# Patient Record
Sex: FEMALE | Race: WHITE | Employment: FULL TIME | URBAN - METROPOLITAN AREA
[De-identification: names, ages, dates, MRNs, and addresses within clinical notes are randomized per-mention and may not be internally consistent; named-entity substitution may affect disease eponyms.]

---

## 2017-03-01 NOTE — PROGRESS NOTES
HPI:    Patient ID: Norina Lombard is a 31year old female. HPI Comments: Fever, chills,..   Body aches. Slight cough,         Review of Systems   Constitutional: Positive for fever, chills and fatigue.  Negative for appetite change and unexpected jude

## 2019-12-24 NOTE — PATIENT INSTRUCTIONS
Medrol dose pack  Take the pills throughout the day   Don't take them before bedtime.      Alternate between ice and heat

## 2019-12-24 NOTE — PROGRESS NOTES
HPI:    Patient ID: Barbie Bennett is a 42year old female. Patient presents for low back pain for the past 1 week. Pain is located in the low back on right side and travels down the leg. No fever/chills. No injury or trauma noted.  Has trouble gettin deficit or motor deficit. Skin: Skin is warm and dry. ASSESSMENT/PLAN:   1.  Acute right-sided low back pain with right-sided sciatica  -After discussion with patient, advised the following:  -To take medrol dose pack   -Educated pt on how to

## 2019-12-26 NOTE — TELEPHONE ENCOUNTER
Spoke with patient via [address] [facility]  ID# [id number] during call  disconnected called back continued call with  ID# [id number]; patient states she is still having right sciatica pain 6-7/10 and the medication is not working.  Patient is r

## 2019-12-26 NOTE — TELEPHONE ENCOUNTER
Noted. Created and signed letter for patient to be off work until Monday. Please call pt to tell her that her letter is ready for . Based on results of x-ray will decide next plan of action.

## 2019-12-26 NOTE — TELEPHONE ENCOUNTER
Pt states that she is still having pain with the sciatica. Pt states that she is taking the medication that was prescribed to her. Per pt she will go today to get her x-ray done. Transferred call to triage.

## 2019-12-31 NOTE — PROGRESS NOTES
HPI:    Patient ID: Beckie Costa is a 42year old female. Patient presents for follow-up on low back pain. The area where the pain is starting to feel hot. No fever/chills noted. Was given medrol dose pack last week and has completed the course.  Feel deficit. Skin: Skin is warm and dry. ASSESSMENT/PLAN:   1.  Acute bilateral low back pain with right-sided sciatica  -After discussion with patient, advised the following:  -Told to take naprosyn for pain relief  -Educated pt on how to take t

## 2020-01-02 NOTE — TELEPHONE ENCOUNTER
Patient requesting note for work stating she can do her work as usual she just cant lift anything heavy due to her sciatic pain. Also stating that she is currently in physical therapy for it,.

## 2020-01-13 NOTE — TELEPHONE ENCOUNTER
Patient requesting a new note stating when the next follow up visit will be with her after physical therapy and until how long is she unable to lift weight.  She stated she has been having difficulty starting the process of booking physical therapy sessions

## 2020-01-14 NOTE — TELEPHONE ENCOUNTER
Patient needs to book physical therapy first then I can tell her how long after. The initial visit gives me an idea of how long the physical therapist would like to work on the patient. I have written a letter for 2 months to be on restricted duty.  Please

## 2020-02-18 NOTE — PROGRESS NOTES
LUMBAR SPINE EVALUATION:   Referring Physician: Dr. Nalini Bender  Diagnosis:  M54.41 (ICD-10-CM) - 724.2, 724.3, 338.19 (ICD-9-CM) - Acute bilateral low back pain with right-sided sciatica Date of Service: 2/17/2020   Date of Onset: December 24, 2019  Clarence martinez approximately 12/17/19 and then on 12/24/29 patient in so much pain was unable to get out of bed due to pain. Saw her physician assistant on same day and was issued medrol dose pack and xray of lumbar spine.   Patient returned to work on 1/6/20 with restric Hip Extension NT NT      Flexibility: WNL, min, mod, severe     R L   Hamstrings moderately restricted moderately restricted   Piriformis moderately restricted moderately restricted   Hip Flexor moderately restricted moderately restricted   TFL moderatel with <2/10 pain and max pain at 2/10  · Pt will be independent and compliant with comprehensive HEP to maintain progress achieved in PT     Frequency / Duration: Patient will be seen for 1-2x/week or a total of 6 initially approved visits over a 90 day per

## 2020-02-19 NOTE — PROGRESS NOTES
Dx: M54.41 (ICD-10-CM) - 724.2, 724.3, 338.19 (ICD-9-CM) - Acute bilateral low back pain with right-sided sciatica         Authorized # of Visits:  6 HMO         Next MD visit: none scheduled  Fall Risk: standard         Precautions: none significant, nathaly to be able to perform work type tasks with <2/10 pain and max pain at 2/10  · Pt will be independent and compliant with comprehensive HEP to maintain progress achieved in PT       Plan: Continue for stretching, strengthening, ROM, postural training, HEP tr

## 2020-02-24 NOTE — PROGRESS NOTES
Dx: M54.41 (ICD-10-CM) - 724.2, 724.3, 338.19 (ICD-9-CM) - Acute bilateral low back pain with right-sided sciatica         Authorized # of Visits:  6 HMO         Next MD visit: none scheduled  Fall Risk: standard         Precautions: none significant, nathaly of proper posture and body mechanics to decrease pain and improve spinal safety   · Pt will improve painfree lumbar spine AROM flexion less than 3\" to floor to allow increase ease with bending forward to don shoes   · Pt will report improved symptom centr

## 2020-03-03 NOTE — PROGRESS NOTES
DC NOTE: Contacted pt to discuss department's initiative to protect all non-essential and high risk patients from COVID-19 exposure. Discussed recommendations relative to pt's home program and to continue with HEP.    Explained that the clinic is cancelli secs  -prone knee flexion: 10 x 10 secs  -Body mechanics training with lifting 6\" off floor to 42\" counter 10 x 2  -prone leg lifts with core activation 5 x 5 secs   -sidelying lateral trunk flexion       Manual:  Lumbar PA mobs-grade III  Lumbar harmoni

## 2020-03-05 NOTE — PROGRESS NOTES
Dx: M54.41 (ICD-10-CM) - 724.2, 724.3, 338.19 (ICD-9-CM) - Acute bilateral low back pain with right-sided sciatica         Authorized # of Visits:  6 HMO         Next MD visit: none scheduled  Fall Risk: standard         Precautions: none significant, nathaly harmonics   Manual:  STM to both lumbar p/s and quad  lumborum  Lumbar harmonics Man tx:  -STM to both lumbar p/s and quad  lumborum  Lumbar harmonics Manual tx: held today   HEP:   HEP: bridge ex and PPU HEP:  Standing back bends  HEP : same HEP.         A

## 2020-03-23 NOTE — TELEPHONE ENCOUNTER
Mongolian Speaking - Patient is requesting a letter from NP Tawanna Skaggs because she states she will be out of work for this week. Patient spoke with NP regarding symptoms over the phone (See Acute Encounter 03/23/2020).  Patient is requesting letter to be

## 2020-03-23 NOTE — TELEPHONE ENCOUNTER
Your patient called with body aches, cough and sore throat. Patient denies any traveling or being in contact with someone who tested positive for the coronavirus.     Please contact patient to determine if they should be treated over the phone by you or req

## 2020-03-23 NOTE — TELEPHONE ENCOUNTER
Virtual/Telephone Check-In    Damir Velarde verbally consents to a Virtual/Telephone Check-In service on 03/23/20. Patient understands and accepts financial responsibility for any deductible, co-insurance and/or co-pays associated with this service.

## 2020-03-25 NOTE — TELEPHONE ENCOUNTER
Contacted pt to discuss department's initiative to protect all non-essential and high risk patients from COVID-19 exposure. Discussed recommendations relative to pt's home program and to continue with HEP.    Explained that the clinic is cancelling visits f

## 2020-03-28 NOTE — TELEPHONE ENCOUNTER
Called with the assistance of language line solutions (#). [address]  Patient does not meet the criteria for COVID 19 testing.      DEE DEE Sosa   Per the system protocol please contact patient to determine if they should be treated over the phone by you or

## 2020-03-28 NOTE — TELEPHONE ENCOUNTER
Patient has not yet received the letter and it was again mailed to her home at her request, she does not currently have a CellPlyhart sign on and will have to go online to retrieve the password

## 2020-03-28 NOTE — TELEPHONE ENCOUNTER
Called and talked to patient. She has been sick with cold symptoms for the past 5 days or so. She has a cough. Body aches have resolved. Still has sour taste in mouth. No fever/chills. No shortness of breath or wheezing noted. No appetite either.  No sense

## 2020-03-31 NOTE — TELEPHONE ENCOUNTER
Patient is requesting for the note to work for it to be faxed to: [phone] ATTN: Osman Youngblood. Please call patient before the letter is sent.

## 2020-03-31 NOTE — TELEPHONE ENCOUNTER
Bahraini Speaking - Patient is calling to follow up and states she never received letter. Please advise.

## 2020-04-02 NOTE — TELEPHONE ENCOUNTER
Patient called back on status. Patient notified of provider's recommendation. Patient verbalized understanding. Patient requested a specific letter to employer. 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   She states the general letter wasn't suff

## 2020-04-02 NOTE — TELEPHONE ENCOUNTER
Message noted. Please call pt and tell her that she can take advil 2 times per day for her headaches as needed. She needs to hydrate well and rest. Continue to eat small amounts of food throughout the day.  If she has high fevers that are not breaking or si

## 2020-04-02 NOTE — TELEPHONE ENCOUNTER
Language line #[phone] assisted with the phone call. This call was on update on 03/28 symptoms.   Pt states that while her cough, bad taste in her mouth and slight fever are gone, she now for the past three days has had a bad headache (forehead, and back of

## 2020-04-07 NOTE — TELEPHONE ENCOUNTER
Working from home today and do not have access to language line. Dr. Melany Baker, patient is Latvian speaking. Do you mind calling this patient for me and seeing what else we can do for?  I did a telephone encounter with the patient infor viral infection last we

## 2020-04-07 NOTE — TELEPHONE ENCOUNTER
Virtual/Telephone Check-In    Beckie Costa verbally consents to a Virtual/Telephone Check-In service on 04/07/20. Patient understands and accepts financial responsibility for any deductible, co-insurance and/or co-pays associated with this service.

## 2020-04-17 NOTE — TELEPHONE ENCOUNTER
Patient called with help from language line Rep #[id number] . Patient was sick a couple weeks ago and  wrote a note to employer for the patient to be out of work until 4/13.      However Monday the patient started getting headaches and dizziness and asked

## 2020-04-20 NOTE — TELEPHONE ENCOUNTER
Patient calling to follow up. States she wanted an additional 2 weeks off. Requesting to go back to work on 5/4. Will like note faxed to her employer to number below.  Patient requesting call back once completed

## 2020-04-21 NOTE — TELEPHONE ENCOUNTER
Patient's last visit note successfully faxed to pt's work . Fax # [phone]. Confirmation  # placed in scanning.

## 2020-04-21 NOTE — PROGRESS NOTES
Virtual Telephone Check-In    Ron Castellanos verbally consents to a Virtual/Telephone Check-In visit on 04/21/20. Patient understands and accepts financial responsibility for any deductible, co-insurance and/or co-pays associated with this service.

## 2020-04-21 NOTE — TELEPHONE ENCOUNTER
Last visit note successfully faxed to pt work. Fax # [phone]. Confirmation number placed in scanning.

## 2021-04-02 NOTE — PROGRESS NOTES
HPI/Subjective:   Patient ID: Ron Castellanos is a 47year old female. Here for annual physical. No complaints. Wanted to do blood test... History/Other:   Review of Systems Constitutional: Negative.   Negative for activity change, appetite camargo

## 2021-11-12 NOTE — PROGRESS NOTES
Subjective:   Patient ID: Cheryle Mckusick is a 46year old female. 10/28 had some menstrual spotting for some days   Then 11/1-11/5 had almost normal period since then having just slight bleeding which is almost gone but still has some   Otherwise well.

## 2022-01-17 NOTE — TELEPHONE ENCOUNTER
Refill passed per [place] [facility] [facility], [facility] protocol.   Requested Prescriptions   Pending Prescriptions Disp Refills    OMEPRAZOLE 40 MG Oral Capsule Delayed Release [Pharmacy Med Name: OMEPRAZOLE 40MG CAPSULES] 90 capsule 0     Sig: TAKE 1 CAPSULE(40 MG) BY MOUTH DAILY        Gastrointestional Medication Protocol Passed - 1/16/2022  3:19 PM        Passed - Appointment in past 12 or next 3 months             Recent Outpatient Visits              2 months ago Menstrual disorder    Corbin Tripp MD    Office Visit    9 months ago Patient overweight    Corbin Tripp MD    Office Visit    1 year ago Upper respiratory infection, acute    [facility], [address] [facility] [place], [place] Abhijeet    [facility]    1 year ago     [address], [place]    Office Visit    1 year ago     [facility] Evelyn Ann, [place]    Office Visit

## 2022-06-01 NOTE — TELEPHONE ENCOUNTER
Patient returning call name and date of birth verified, informed patient of dr notes per lab results and to fullow up in 3 months per pt verbalized understanding, offered patient to make an appt per pt stated she would call to make an appt, no further questions

## 2022-12-14 NOTE — TELEPHONE ENCOUNTER
Refill passed per [facility] protocol   Requested Prescriptions   Pending Prescriptions Disp Refills    OMEPRAZOLE 40 MG Oral Capsule Delayed Release [Pharmacy Med Name: OMEPRAZOLE 40MG CAPSULES] 90 capsule 1     Sig: TAKE 1 CAPSULE(40 MG) BY MOUTH DAILY       Gastrointestional Medication Protocol Passed - 12/13/2022  6:53 PM        Passed - In person appointment or virtual visit in the past 12 mos or appointment in next 3 mos     Recent Outpatient Visits              6 months ago Annual physical exam    Jeyson Madrigal MD    Office Visit    1 year ago Menstrual disorder    Jeyson Madrigal MD    Office Visit    1 year ago Patient overweight    Jeyson Madrigal MD    Office Visit    2 years ago Upper respiratory infection, acute    [address] [place], [address] [facility] [place], Nathan Chamberlain MD    [facility]    2 years ago     [facility] Tristian [place], [address] [place]    Office Visit

## 2024-01-17 NOTE — PROGRESS NOTES
Subjective:   Patient ID: Mica Laws is a 43 year old female.    Pt presents with hx of irregular and heavy periods over the last several months and was seeing a gynecologist. No fevers or sig pains.   Pt did have a pap smear which was normal with a private gynecologist. Stared on OCP but does not think this is helping.    Pt needs referral for gynecologist.   Information obtained with help of .        History/Other:   Review of Systems   Constitutional:  Negative for fever.   Genitourinary:  Positive for vaginal bleeding.     Current Outpatient Medications   Medication Sig Dispense Refill    Norgestim-Eth Estrad Triphasic 0.18/0.215/0.25 MG-35 MCG Oral Tab Take 1 tablet by mouth daily.      famotidine 20 MG Oral Tab Take 1 tablet (20 mg total) by mouth 2 (two) times daily.      Omeprazole 40 MG Oral Capsule Delayed Release Take 1 capsule (40 mg total) by mouth daily. 90 capsule 1     Allergies:No Known Allergies    Objective:   Physical Exam  Constitutional:       Appearance: Normal appearance.   Neurological:      Mental Status: She is alert.   Psychiatric:         Mood and Affect: Mood normal.         Behavior: Behavior normal.         Assessment & Plan:   1. Irregular periods    2. Menorrhagia with irregular cycle: not better on OCP  - After discussion with help of , will send to gynecology for further evaluation and treatment; To call if any significant symptoms. ER if any sig symptoms or bleeding. Patient verbalized understanding of recommendations and agrees to plan.             No orders of the defined types were placed in this encounter.      Meds This Visit:  Requested Prescriptions      No prescriptions requested or ordered in this encounter       Imaging & Referrals:  [facility] - INTERNAL

## 2024-01-18 ENCOUNTER — HOSPITAL ENCOUNTER (EMERGENCY)
Facility: HOSPITAL | Age: 44
Discharge: HOME | End: 2024-01-18
Attending: EMERGENCY MEDICINE
Payer: COMMERCIAL

## 2024-01-18 VITALS
SYSTOLIC BLOOD PRESSURE: 119 MMHG | WEIGHT: 181 LBS | RESPIRATION RATE: 20 BRPM | HEART RATE: 86 BPM | HEIGHT: 60 IN | DIASTOLIC BLOOD PRESSURE: 71 MMHG | BODY MASS INDEX: 35.53 KG/M2 | TEMPERATURE: 98 F | OXYGEN SATURATION: 96 %

## 2024-01-18 DIAGNOSIS — N93.9 VAGINAL BLEEDING: Primary | ICD-10-CM

## 2024-01-18 DIAGNOSIS — D64.9 ANEMIA, UNSPECIFIED TYPE: ICD-10-CM

## 2024-01-18 LAB
ALBUMIN SERPL-MCNC: 3.6 G/DL (ref 3.2–4.8)
ALBUMIN/GLOB SERPL: 1.4 {RATIO} (ref 1–2)
ALP LIVER SERPL-CCNC: 60 U/L
ALT SERPL-CCNC: 38 U/L
ANION GAP SERPL CALC-SCNC: 4 MMOL/L (ref 0–18)
AST SERPL-CCNC: 24 U/L (ref ?–34)
BASOPHILS # BLD AUTO: 0.04 X10(3) UL (ref 0–0.2)
BASOPHILS NFR BLD AUTO: 0.5 %
BILIRUB SERPL-MCNC: 0.4 MG/DL (ref 0.3–1.2)
BUN BLD-MCNC: 13 MG/DL (ref 9–23)
BUN/CREAT SERPL: 17.3 (ref 10–20)
CALCIUM BLD-MCNC: 8.6 MG/DL (ref 8.7–10.4)
CHLORIDE SERPL-SCNC: 107 MMOL/L (ref 98–112)
CO2 SERPL-SCNC: 29 MMOL/L (ref 21–32)
CREAT BLD-MCNC: 0.75 MG/DL
DEPRECATED RDW RBC AUTO: 43.9 FL (ref 35.1–46.3)
EGFRCR SERPLBLD CKD-EPI 2021: 103 ML/MIN/1.73M2 (ref 60–?)
EOSINOPHIL # BLD AUTO: 0.08 X10(3) UL (ref 0–0.7)
EOSINOPHIL NFR BLD AUTO: 0.9 %
ERYTHROCYTE [DISTWIDTH] IN BLOOD BY AUTOMATED COUNT: 14.1 % (ref 11–15)
GLOBULIN PLAS-MCNC: 2.6 G/DL (ref 2.8–4.4)
GLUCOSE BLD-MCNC: 136 MG/DL (ref 70–99)
HCT VFR BLD AUTO: 26.9 %
HGB BLD-MCNC: 8.5 G/DL
IMM GRANULOCYTES # BLD AUTO: 0.03 X10(3) UL (ref 0–1)
IMM GRANULOCYTES NFR BLD: 0.3 %
LYMPHOCYTES # BLD AUTO: 1.77 X10(3) UL (ref 1–4)
LYMPHOCYTES NFR BLD AUTO: 20.4 %
MCH RBC QN AUTO: 27.2 PG (ref 26–34)
MCHC RBC AUTO-ENTMCNC: 31.6 G/DL (ref 31–37)
MCV RBC AUTO: 85.9 FL
MONOCYTES # BLD AUTO: 0.38 X10(3) UL (ref 0.1–1)
MONOCYTES NFR BLD AUTO: 4.4 %
NEUTROPHILS # BLD AUTO: 6.37 X10 (3) UL (ref 1.5–7.7)
NEUTROPHILS # BLD AUTO: 6.37 X10(3) UL (ref 1.5–7.7)
NEUTROPHILS NFR BLD AUTO: 73.5 %
OSMOLALITY SERPL CALC.SUM OF ELEC: 292 MOSM/KG (ref 275–295)
PLATELET # BLD AUTO: 375 10(3)UL (ref 150–450)
POTASSIUM SERPL-SCNC: 3.8 MMOL/L (ref 3.5–5.1)
PROT SERPL-MCNC: 6.2 G/DL (ref 5.7–8.2)
RBC # BLD AUTO: 3.13 X10(6)UL
SODIUM SERPL-SCNC: 140 MMOL/L (ref 136–145)
WBC # BLD AUTO: 8.7 X10(3) UL (ref 4–11)

## 2024-01-18 PROCEDURE — 99284 EMERGENCY DEPT VISIT MOD MDM: CPT

## 2024-01-18 PROCEDURE — 36415 COLL VENOUS BLD VENIPUNCTURE: CPT

## 2024-01-18 PROCEDURE — 80053 COMPREHEN METABOLIC PANEL: CPT

## 2024-01-18 PROCEDURE — 85025 COMPLETE CBC W/AUTO DIFF WBC: CPT | Performed by: EMERGENCY MEDICINE

## 2024-01-18 PROCEDURE — 80053 COMPREHEN METABOLIC PANEL: CPT | Performed by: EMERGENCY MEDICINE

## 2024-01-18 PROCEDURE — 99283 EMERGENCY DEPT VISIT LOW MDM: CPT

## 2024-01-18 PROCEDURE — 85025 COMPLETE CBC W/AUTO DIFF WBC: CPT

## 2024-01-18 NOTE — ED INITIAL ASSESSMENT (HPI)
Patient arrived via EMS from home with c/o vaginal bleeding since September that worsened yesterday. Reports having to change her pad every hour for the past 6 hours. Feels weak, shortness of breath on exertion.

## 2024-01-18 NOTE — ED PROVIDER NOTES
Patient Seen in: [facility] Emergency Department      History     Chief Complaint   Patient presents with    Weakness    Bleeding     Stated Complaint:     Subjective:   HPI    43-year-old female presents for evaluation of vaginal bleeding.  Patient reports she has had vaginal bleeding every day since September.  Since yesterday she has had heavier bleeding and now with clots.  She has an appointment with gynecology at 4 PM today, however started to feel lightheaded which prompted this visit to the emergency department.  She is not on anticoagulation.    Objective:   History reviewed. No pertinent past medical history.           History reviewed. No pertinent surgical history.             Social History     Socioeconomic History    Marital status: Single    Number of children: 2   Tobacco Use    Smoking status: Never    Smokeless tobacco: Never   Vaping Use    Vaping Use: Never used   Substance and Sexual Activity    Alcohol use: Never     Alcohol/week: 0.0 standard drinks of alcohol    Drug use: Never   Social History Narrative    2 children,                       Review of Systems    Positive for stated complaint:   Other systems are as noted in HPI.  Constitutional and vital signs reviewed.      All other systems reviewed and negative except as noted above.    Physical Exam     ED Triage Vitals [01/18/24 1220]   /77   Pulse 86   Resp 20   Temp 97.8 °F (36.6 °C)   Temp src Oral   SpO2 96 %   O2 Device None (Room air)       Current:/71   Pulse 86   Temp 97.8 °F (36.6 °C) (Oral)   Resp 20   Ht 152.4 cm (5')   Wt 82.1 kg   LMP 01/08/2024   SpO2 96%   BMI 35.35 kg/m²         Physical Exam  Vitals and nursing note reviewed.   Constitutional:       General: She is not in acute distress.     Appearance: Normal appearance. She is not ill-appearing or toxic-appearing.   HENT:      Head: Normocephalic and atraumatic.   Eyes:      Conjunctiva/sclera: Conjunctivae normal.   Cardiovascular:       Rate and Rhythm: Normal rate and regular rhythm.   Pulmonary:      Effort: Pulmonary effort is normal. No respiratory distress.      Breath sounds: Normal breath sounds.   Abdominal:      Palpations: Abdomen is soft.      Tenderness: There is no abdominal tenderness.   Genitourinary:     Comments: 2 cm clot cleared from vaginal vault, no active bleeding  Musculoskeletal:         General: Normal range of motion.      Cervical back: Normal range of motion. No rigidity.   Neurological:      General: No focal deficit present.      Mental Status: She is alert.   Psychiatric:         Mood and Affect: Mood normal.               ED Course     Labs Reviewed   COMP METABOLIC PANEL (14) - Abnormal; Notable for the following components:       Result Value    Glucose 136 (*)     Calcium, Total 8.6 (*)     Globulin  2.6 (*)     All other components within normal limits   CBC W/ DIFFERENTIAL - Abnormal; Notable for the following components:    RBC 3.13 (*)     HGB 8.5 (*)     HCT 26.9 (*)     All other components within normal limits   CBC WITH DIFFERENTIAL WITH PLATELET    Narrative:     The following orders were created for panel order CBC With Differential With Platelet.  Procedure                               Abnormality         Status                     ---------                               -----------         ------                     CBC W/ DIFFERENTIAL[[id number]]          Abnormal            Final result                 Please view results for these tests on the individual orders.   RAINBOW DRAW BLUE                      MDM      Medical Decision Making  Differential diagnosis includes but is not limited to dysfunctional uterine bleeding, malignancy, fibroids    Well-appearing patient with normal vital signs, noted to have a drop in hemoglobin, today Hemoglobin is 8.5, was 11.1 on 12/29/2023, prior to this was 13.9 on 5/28/2022.  Patient does not meet criteria for transfusion, discussed this with patient and niece at  the bedside.  Patient will however need further evaluation for definitive management, fortunately she has an appointment today at 4 PM, I did discuss this with the PA who is scheduled to see the patient in 1.5 hours.  Return precautions discussed.    Problems Addressed:  Vaginal bleeding: acute illness or injury    Amount and/or Complexity of Data Reviewed  External Data Reviewed: labs.  Labs: ordered.  Discussion of management or test interpretation with external provider(s): Discussed with gynecology        Disposition and Plan     Clinical Impression:  1. Vaginal bleeding    2. Anemia, unspecified type         Disposition:  Discharge  1/18/2024  2:10 pm    Follow-up:  Devin Tang PA-C [address]  [address] [address]  [phone]    Follow up            Medications Prescribed:  Discharge Medication List as of 1/18/2024  2:32 PM

## 2024-01-18 NOTE — PROGRESS NOTES
[facility]  Obstetrics and Gynecology  Gyne Problem Visit      Mica Laws is a 43 year old female  presenting today with heavy prolonged menses. States she has had irregular menses since 2023. November she had heavier bleeding with clots. In December she stopped for a few weeks then started to spot. Then this month she had heavier bleeding. She came here from the ER after going due to lightheadedness and dizziness. Her Hgb on 2023 11.2 and today her hcg dropped to 8.5. She was started on COCs this week by another provider. She denies a history of uterine fibroids. Prior to this patient had irregular menses but they were not heavy. She has not been on hormonal contraceptives prior to this week, She is still feeling very weak and dizzy.     Patient's last menstrual period was 2024 (exact date).     Pap: 2022  Contraception:OCP    OBSTETRICS HISTORY:  OB History    Para Term  AB Living   2 2 0 0 0 2   SAB IAB Ectopic Multiple Live Births   0 0 0 0 2       GYNE HISTORY:  Hx Prior Abnormal Pap: Yes   Menarche: 11 (2024  4:02 PM)  Period Cycle (Days): irregular (2024  4:02 PM)  Period Duration (Days): varys (2024  4:02 PM)  Period Flow: heavy (2024  4:02 PM)  Use of Birth Control (if yes, specify type): None (2024  4:02 PM)  Hx Prior Abnormal Pap: Yes (2024  4:02 PM)        2022    10:37 AM   RECENT PAP RESULTS   Thinprep Pap Negative for intraepithelial lesion or malignancy.          History   Sexual Activity    Sexual activity: Not on file       MEDICAL HISTORY:  No past medical history on file.  History reviewed. No pertinent surgical history.    SOCIAL HISTORY:  Social History     Socioeconomic History    Marital status: Single     Spouse name: Not on file    Number of children: 2    Years of education: Not on file    Highest education level: Not on file   Occupational History    Not on file   Tobacco Use    Smoking status: Never    Smokeless tobacco:  Never   Vaping Use    Vaping Use: Never used   Substance and Sexual Activity    Alcohol use: Never     Alcohol/week: 0.0 standard drinks of alcohol    Drug use: Never    Sexual activity: Not on file   Other Topics Concern    Not on file   Social History Narrative    2 children,              Social Determinants of Health     Financial Resource Strain: Not on file   Food Insecurity: Not on file   Transportation Needs: Not on file   Physical Activity: Not on file   Stress: Not on file   Social Connections: Not on file   Housing Stability: Not on file       MEDICATIONS:    Current Outpatient Medications:     Norgestim-Eth Estrad Triphasic 0.18/0.215/0.25 MG-35 MCG Oral Tab, Take 1 tablet by mouth daily., Disp: , Rfl:     famotidine 20 MG Oral Tab, Take 1 tablet (20 mg total) by mouth 2 (two) times daily., Disp: , Rfl:     Omeprazole 40 MG Oral Capsule Delayed Release, Take 1 capsule (40 mg total) by mouth daily., Disp: 90 capsule, Rfl: 1    ALLERGIES:  No Known Allergies      REVIEW OF SYSTEMS:  Review of Systems   Constitutional:  Negative for chills, fever and unexpected weight change.   Respiratory: Negative.     Cardiovascular: Negative.    Gastrointestinal:  Negative for abdominal pain, constipation, diarrhea and nausea.   Genitourinary:  Positive for menstrual problem (heavy prolonged menses). Negative for dyspareunia, dysuria, genital sores, hematuria, vaginal bleeding, vaginal discharge and vaginal pain.   Musculoskeletal: Negative.    Skin: Negative.    Neurological: Negative.    Hematological: Negative.    Psychiatric/Behavioral: Negative.         PHYSICAL EXAM:  /78   Ht 5' (1.524 m)   Wt 178 lb 3.2 oz (80.8 kg)   LMP 01/08/2024 (Exact Date)   BMI 34.80 kg/m²     GENERAL: well developed, well nourished, in no apparent distress  ABDOMEN: Soft, non distended; non tender, no masses  GYNE/: External Genitalia: Normal appearing, no lesions. Urethral meatus appear wnl, no abnormal discharge or lesions  noted.          Bladder: well supported, urethra wnl, no lesions or fissures                     Vagina: normal pink mucosa, no lesions, moderate menstrual                      Uterus: , mobile, non tender, normal size                     Cervix: Normal                      Adnexa: non tender, no masses, normal size    Endometrial Biopsy     Pre-Procedure Care:   Consent was obtained.  Procedure/risks were explained.  Questions were answered.  Correct patient was identified.  Correct side and site were confirmed.    Pregnancy Results: negative from urine test   Birth control method(s) used:      Indication: heavy prolonged menses     Description of Procedure:   A bivalve speculum was placed in the vagina and the cervix was prepped with betadine solution.   Single tooth tenaculum placed at the 12 o'clock position.   The uterine cavity was sounded at 8 cm.   The endometrial cavity was curetted for pipelle tissue sampling with 3 passes.  Specimen was sent to pathology.   The single tooth tenaculum was removed.   Silver nitrate was applied at the site of tenaculum application   Good hemostasis was noted.  There were no complications.    There was no blood loss.         ASSESSMENT:       ICD-10-CM    1. Menorrhagia with irregular cycle  N92.1       2. Excessive or frequent menstruation  N92.0 BIOPSY OF UTERUS LINING (00549)          Plan:  - Pt counseled on possible etiologies of heavy periods and/or irregular bleeding including uterine fibroids, DUB/anovulatory bleeding and other benign and less common malignant etiologies.  Discussed possible work-up options including exam, pelvic US and endometrial biopsy.  Discussed treatment options including medical and surgical.  Medical options include OCPs, Nuvaring, patch for cycle control along with depo provera for menstrual suppression.  Discussed Mirena IUD and menstrual benefits.  Discussed surgical options that include endometrial ablation and hysterectomy.  Pt counseled  on risks/benefits of each of the appropriate options.  - Pelvic ultrasound ordered  - Endometrial biopsy done  - Patient opted to switch from OCPs to Depo injection  - Advised to start iron supplements  - Strict return precautions given to patient for the ER, If she is changing multiple pads within an hour or passing clots larger than her fist she should return to ER.  - RTC in 2 weeks for follow-up      Requested Prescriptions     Pending Prescriptions Disp Refills    Ferrous Sulfate 325 (65 Fe) MG Oral Tab 30 tablet 3     Sig: Take 1 tablet (325 mg total) by mouth daily with breakfast.    medroxyPROGESTERone Acetate SANKET 150 mg         YVONNE CALVO PA-C  4:10 PM  1/18/2024        Spent total time 40 minutes on obtaining history / chart review, evaluating patient / performing medically appropriate exam, discussing treatment options, counseling / educating, and completing documentation, coordinating care.

## 2024-01-18 NOTE — DISCHARGE INSTRUCTIONS
See gynecology for further evaluation this afternoon.    Return to the ER if you develop worsening symptoms or any emergent concerns.

## 2024-01-19 ENCOUNTER — HOSPITAL ENCOUNTER (EMERGENCY)
Facility: HOSPITAL | Age: 44
Discharge: HOME | End: 2024-01-19
Attending: EMERGENCY MEDICINE
Payer: COMMERCIAL

## 2024-01-19 VITALS
SYSTOLIC BLOOD PRESSURE: 131 MMHG | HEIGHT: 60 IN | RESPIRATION RATE: 15 BRPM | DIASTOLIC BLOOD PRESSURE: 66 MMHG | OXYGEN SATURATION: 99 % | HEART RATE: 82 BPM | TEMPERATURE: 98 F | WEIGHT: 178 LBS | BODY MASS INDEX: 34.95 KG/M2

## 2024-01-19 DIAGNOSIS — D50.0 IRON DEFICIENCY ANEMIA DUE TO CHRONIC BLOOD LOSS: Primary | ICD-10-CM

## 2024-01-19 DIAGNOSIS — N92.1 MENOMETRORRHAGIA: ICD-10-CM

## 2024-01-19 LAB
ANION GAP SERPL CALC-SCNC: 9 MMOL/L (ref 0–18)
ANTIBODY SCREEN: NEGATIVE
ATRIAL RATE: 98 BPM
BASOPHILS # BLD AUTO: 0.1 X10(3) UL (ref 0–0.2)
BASOPHILS NFR BLD AUTO: 0.7 %
BUN BLD-MCNC: 10 MG/DL (ref 9–23)
BUN/CREAT SERPL: 12.8 (ref 10–20)
CALCIUM BLD-MCNC: 8.7 MG/DL (ref 8.7–10.4)
CHLORIDE SERPL-SCNC: 105 MMOL/L (ref 98–112)
CO2 SERPL-SCNC: 24 MMOL/L (ref 21–32)
CREAT BLD-MCNC: 0.78 MG/DL
DEPRECATED RDW RBC AUTO: 45.4 FL (ref 35.1–46.3)
EGFRCR SERPLBLD CKD-EPI 2021: 98 ML/MIN/1.73M2 (ref 60–?)
EOSINOPHIL # BLD AUTO: 0.04 X10(3) UL (ref 0–0.7)
EOSINOPHIL NFR BLD AUTO: 0.3 %
ERYTHROCYTE [DISTWIDTH] IN BLOOD BY AUTOMATED COUNT: 14.3 % (ref 11–15)
GLUCOSE BLD-MCNC: 104 MG/DL (ref 70–99)
HCT VFR BLD AUTO: 22.2 %
HGB BLD-MCNC: 7.2 G/DL
IMM GRANULOCYTES # BLD AUTO: 0.06 X10(3) UL (ref 0–1)
IMM GRANULOCYTES NFR BLD: 0.4 %
LYMPHOCYTES # BLD AUTO: 3.27 X10(3) UL (ref 1–4)
LYMPHOCYTES NFR BLD AUTO: 22.9 %
MCH RBC QN AUTO: 28.6 PG (ref 26–34)
MCHC RBC AUTO-ENTMCNC: 32.4 G/DL (ref 31–37)
MCV RBC AUTO: 88.1 FL
MONOCYTES # BLD AUTO: 0.87 X10(3) UL (ref 0.1–1)
MONOCYTES NFR BLD AUTO: 6.1 %
NEUTROPHILS # BLD AUTO: 9.92 X10 (3) UL (ref 1.5–7.7)
NEUTROPHILS # BLD AUTO: 9.92 X10(3) UL (ref 1.5–7.7)
NEUTROPHILS NFR BLD AUTO: 69.6 %
OSMOLALITY SERPL CALC.SUM OF ELEC: 285 MOSM/KG (ref 275–295)
P AXIS: 41 DEGREES
P-R INTERVAL: 144 MS
PLATELET # BLD AUTO: 379 10(3)UL (ref 150–450)
POTASSIUM SERPL-SCNC: 3.8 MMOL/L (ref 3.5–5.1)
Q-T INTERVAL: 336 MS
QRS DURATION: 74 MS
QTC CALCULATION (BEZET): 428 MS
R AXIS: 46 DEGREES
RBC # BLD AUTO: 2.52 X10(6)UL
RH BLOOD TYPE: POSITIVE
SODIUM SERPL-SCNC: 138 MMOL/L (ref 136–145)
T AXIS: 52 DEGREES
VENTRICULAR RATE: 98 BPM
WBC # BLD AUTO: 14.3 X10(3) UL (ref 4–11)

## 2024-01-19 PROCEDURE — 99284 EMERGENCY DEPT VISIT MOD MDM: CPT

## 2024-01-19 PROCEDURE — 85025 COMPLETE CBC W/AUTO DIFF WBC: CPT | Performed by: EMERGENCY MEDICINE

## 2024-01-19 PROCEDURE — 93005 ELECTROCARDIOGRAM TRACING: CPT

## 2024-01-19 PROCEDURE — 93010 ELECTROCARDIOGRAM REPORT: CPT

## 2024-01-19 PROCEDURE — 96361 HYDRATE IV INFUSION ADD-ON: CPT

## 2024-01-19 PROCEDURE — 96365 THER/PROPH/DIAG IV INF INIT: CPT

## 2024-01-19 PROCEDURE — 86901 BLOOD TYPING SEROLOGIC RH(D): CPT | Performed by: EMERGENCY MEDICINE

## 2024-01-19 PROCEDURE — 80048 BASIC METABOLIC PNL TOTAL CA: CPT | Performed by: EMERGENCY MEDICINE

## 2024-01-19 PROCEDURE — 86850 RBC ANTIBODY SCREEN: CPT | Performed by: EMERGENCY MEDICINE

## 2024-01-19 PROCEDURE — 86900 BLOOD TYPING SEROLOGIC ABO: CPT | Performed by: EMERGENCY MEDICINE

## 2024-01-19 NOTE — ED INITIAL ASSESSMENT (HPI)
Patient reports dizziness and nausea with sitting and ambulation over the last few days. Denies CP/SOB.

## 2024-01-19 NOTE — ED PROVIDER NOTES
Patient Seen in: [facility] Emergency Department      History     Chief Complaint   Patient presents with    Dizziness    Nausea     Stated Complaint: Tachycardia;Dizzy    Subjective:   HPI    The patient is a 43-year-old female who presents with lightheadedness generalized weakness and heart racing with exertion over the last few days.  The patient has had irregular vaginal bleeding for the last 3 to 4 months with bleeding almost daily.  Yesterday she had clots and was seen in the ER.  She had dropped her hemoglobin to 8.5.  Vital signs were stable she was discharged and saw gynecology yesterday and had an endometrial biopsy done and was started on iron tablets and was given a Depo-Provera shot.  She states that the vaginal bleeding has almost subsided since yesterday but she still feels weak and lightheaded.  No fevers or chills.  No chest pain.  No symptoms at rest    Objective:   No pertinent past medical history.            No pertinent past surgical history.              No pertinent social history.            Review of Systems    Positive for stated complaint: Tachycardia;Dizzy  Other systems are as noted in HPI.  Constitutional and vital signs reviewed.      All other systems reviewed and negative except as noted above.    Physical Exam     ED Triage Vitals   BP 01/19/24 1632 128/68   Pulse 01/19/24 1632 102   Resp 01/19/24 1632 19   Temp 01/19/24 1632 98 °F (36.7 °C)   Temp src --    SpO2 01/19/24 1632 100 %   O2 Device 01/19/24 1800 None (Room air)       Current:/72   Pulse 106   Temp 98 °F (36.7 °C)   Resp 16   Ht 152.4 cm (5')   Wt 80.7 kg   LMP 01/08/2024 (Exact Date)   SpO2 99%   BMI 34.76 kg/m²         Physical Exam  Vitals and nursing note reviewed.   Constitutional:       General: She is not in acute distress.     Appearance: Normal appearance. She is well-developed. She is not ill-appearing.   HENT:      Head: Normocephalic and atraumatic.      Mouth/Throat:      Mouth:  Mucous membranes are moist.   Eyes:      Extraocular Movements: Extraocular movements intact.      Conjunctiva/sclera: Conjunctivae normal.      Pupils: Pupils are equal, round, and reactive to light.   Neck:      Vascular: No JVD.   Cardiovascular:      Rate and Rhythm: Normal rate and regular rhythm.      Heart sounds: Normal heart sounds. No murmur heard.  Pulmonary:      Effort: Pulmonary effort is normal.      Breath sounds: Normal breath sounds.   Abdominal:      General: Bowel sounds are normal. There is no distension.      Palpations: Abdomen is soft. There is no mass.      Tenderness: There is no abdominal tenderness. There is no right CVA tenderness or left CVA tenderness.   Musculoskeletal:         General: Normal range of motion.      Cervical back: Normal range of motion and neck supple.      Right lower leg: No edema.      Left lower leg: No edema.   Skin:     General: Skin is warm and dry.      Capillary Refill: Capillary refill takes less than 2 seconds.      Coloration: Skin is pale.      Findings: No rash.   Neurological:      General: No focal deficit present.      Mental Status: She is alert and oriented to person, place, and time.      Deep Tendon Reflexes: Reflexes are normal and symmetric.   Psychiatric:         Judgment: Judgment normal.       Differential diagnosis includes anemia, arrhythmia, electrolyte abnormality        ED Course     Labs Reviewed   BASIC METABOLIC PANEL (8) - Abnormal; Notable for the following components:       Result Value    Glucose 104 (*)     All other components within normal limits   CBC W/ DIFFERENTIAL - Abnormal; Notable for the following components:    WBC 14.3 (*)     RBC 2.52 (*)     HGB 7.2 (*)     HCT 22.2 (*)     Neutrophil Absolute Prelim 9.92 (*)     Neutrophil Absolute 9.92 (*)     All other components within normal limits   CBC WITH DIFFERENTIAL WITH PLATELET    Narrative:     The following orders were created for panel order CBC With Differential  With Platelet.  Procedure                               Abnormality         Status                     ---------                               -----------         ------                     CBC W/ DIFFERENTIAL[[id number]]          Abnormal            Final result                 Please view results for these tests on the individual orders.   TYPE AND SCREEN    Narrative:     The following orders were created for panel order Type and screen.  Procedure                               Abnormality         Status                     ---------                               -----------         ------                     ABORH (Blood Type)[[id number]]                               Final result               Antibody Screen[[id number]]                                  Final result                 Please view results for these tests on the individual orders.   ABORH (BLOOD TYPE)   ANTIBODY SCREEN   ABORH CONFIRMATION   RAINBOW DRAW BLUE     EKG    Rate, intervals and axes as noted on EKG Report.  Rate: 98  Rhythm: Sinus Rhythm  Reading: Sinus no ST elevation or significant arrhythmia                          MDM      Pulse ox 100% on room air, normal                                   Medical Decision Making  Patient feels much better after IV fluids and iron infusion  No further bleeding and no bleeding in the ER  Awaiting results of endometrial biopsy  Patient to return if any bleeding recurs or worsening dizziness and to rest until she is feeling better  Vital signs stable prior to discharge    Problems Addressed:  Iron deficiency anemia due to chronic blood loss: acute illness or injury  Menometrorrhagia: acute illness or injury    Amount and/or Complexity of Data Reviewed  External Data Reviewed: notes.     Details: From gynecology visit yesterday reviewed  Labs: ordered.    Risk  OTC drugs.        Disposition and Plan     Clinical Impression:  1. Iron deficiency anemia due to chronic blood loss    2. Menometrorrhagia          Disposition:  Discharge  1/19/2024  8:09 pm    Follow-up:  your gynecologist    Schedule an appointment as soon as possible for a visit in 3 day(s)            Medications Prescribed:  Current Discharge Medication List

## 2024-01-19 NOTE — ED INITIAL ASSESSMENT (HPI)
Patient states was seen yesterday in ER for heavy vaginal bleeding, hx of anemia. Hgb 8.5 yesterday. Patient reports getting a \"shot to stop the bleeding\" yesterday, but states bleeding is continuing but less than before.

## 2024-01-20 NOTE — DISCHARGE INSTRUCTIONS
Return if worsening dizziness  Continue to take iron tablets  Follow-up with your gynecologist next week  Return if any further bleeding

## 2024-01-20 NOTE — ED QUICK NOTES
Rounding Completed    Plan of Care reviewed. Waiting for disposition.  Elimination needs assessed.  Provided update.    Bed is locked and in lowest position. Call light within reach.

## 2024-01-22 NOTE — TELEPHONE ENCOUNTER
Called patient, Hgb continues to drop. Patient states she is still bleeding. Started on premarin 1.25 daily x 3 weeks. Follow-up with me on 2/14. Ultrasound scheduled for this Friday.

## 2024-01-23 ENCOUNTER — HOSPITAL ENCOUNTER (EMERGENCY)
Facility: HOSPITAL | Age: 44
Discharge: HOME | End: 2024-01-23
Attending: EMERGENCY MEDICINE
Payer: COMMERCIAL

## 2024-01-23 VITALS
HEART RATE: 81 BPM | TEMPERATURE: 98 F | WEIGHT: 178 LBS | RESPIRATION RATE: 18 BRPM | OXYGEN SATURATION: 100 % | BODY MASS INDEX: 34.95 KG/M2 | SYSTOLIC BLOOD PRESSURE: 106 MMHG | DIASTOLIC BLOOD PRESSURE: 57 MMHG | HEIGHT: 60 IN

## 2024-01-23 DIAGNOSIS — D50.9 IRON DEFICIENCY ANEMIA, UNSPECIFIED IRON DEFICIENCY ANEMIA TYPE: ICD-10-CM

## 2024-01-23 DIAGNOSIS — N93.9 ABNORMAL UTERINE BLEEDING: Primary | ICD-10-CM

## 2024-01-23 LAB
ALBUMIN SERPL-MCNC: 4.3 G/DL (ref 3.2–4.8)
ALBUMIN/GLOB SERPL: 1.5 {RATIO} (ref 1–2)
ALP LIVER SERPL-CCNC: 66 U/L
ALT SERPL-CCNC: 37 U/L
ANION GAP SERPL CALC-SCNC: 9 MMOL/L (ref 0–18)
ANTIBODY SCREEN: NEGATIVE
AST SERPL-CCNC: 35 U/L (ref ?–34)
BILIRUB SERPL-MCNC: 0.2 MG/DL (ref 0.3–1.2)
BUN BLD-MCNC: 9 MG/DL (ref 9–23)
BUN/CREAT SERPL: 12.2 (ref 10–20)
CALCIUM BLD-MCNC: 9.1 MG/DL (ref 8.7–10.4)
CHLORIDE SERPL-SCNC: 106 MMOL/L (ref 98–112)
CO2 SERPL-SCNC: 25 MMOL/L (ref 21–32)
CREAT BLD-MCNC: 0.74 MG/DL
EGFRCR SERPLBLD CKD-EPI 2021: 104 ML/MIN/1.73M2 (ref 60–?)
GLOBULIN PLAS-MCNC: 2.8 G/DL (ref 2.8–4.4)
GLUCOSE BLD-MCNC: 110 MG/DL (ref 70–99)
OSMOLALITY SERPL CALC.SUM OF ELEC: 289 MOSM/KG (ref 275–295)
POTASSIUM SERPL-SCNC: 4.3 MMOL/L (ref 3.5–5.1)
PROT SERPL-MCNC: 7.1 G/DL (ref 5.7–8.2)
RH BLOOD TYPE: POSITIVE
SODIUM SERPL-SCNC: 140 MMOL/L (ref 136–145)

## 2024-01-23 PROCEDURE — 80053 COMPREHEN METABOLIC PANEL: CPT

## 2024-01-23 PROCEDURE — 36415 COLL VENOUS BLD VENIPUNCTURE: CPT

## 2024-01-23 PROCEDURE — 85060 BLOOD SMEAR INTERPRETATION: CPT | Performed by: EMERGENCY MEDICINE

## 2024-01-23 PROCEDURE — 85025 COMPLETE CBC W/AUTO DIFF WBC: CPT | Performed by: EMERGENCY MEDICINE

## 2024-01-23 PROCEDURE — 80053 COMPREHEN METABOLIC PANEL: CPT | Performed by: EMERGENCY MEDICINE

## 2024-01-23 PROCEDURE — 99285 EMERGENCY DEPT VISIT HI MDM: CPT

## 2024-01-23 PROCEDURE — 36430 TRANSFUSION BLD/BLD COMPNT: CPT

## 2024-01-23 PROCEDURE — 85025 COMPLETE CBC W/AUTO DIFF WBC: CPT

## 2024-01-23 PROCEDURE — 85060 BLOOD SMEAR INTERPRETATION: CPT

## 2024-01-23 PROCEDURE — 99284 EMERGENCY DEPT VISIT MOD MDM: CPT

## 2024-01-23 PROCEDURE — 86920 COMPATIBILITY TEST SPIN: CPT

## 2024-01-23 PROCEDURE — 86901 BLOOD TYPING SEROLOGIC RH(D): CPT | Performed by: EMERGENCY MEDICINE

## 2024-01-23 PROCEDURE — 86850 RBC ANTIBODY SCREEN: CPT | Performed by: EMERGENCY MEDICINE

## 2024-01-23 PROCEDURE — 86900 BLOOD TYPING SEROLOGIC ABO: CPT | Performed by: EMERGENCY MEDICINE

## 2024-01-23 RX ORDER — ESTRADIOL 1 MG/1
1 TABLET ORAL 3 TIMES DAILY
Qty: 21 TABLET | Refills: 0 | Status: SHIPPED | OUTPATIENT
Start: 2024-01-23 | End: 2024-01-30

## 2024-01-23 NOTE — ED INITIAL ASSESSMENT (HPI)
Pt. Reports being discharged from the ER last Friday and reports having a low hemoglobin, and today started feeling nauseas and weak, and has a headache, and has trouble catching her breath.     Reports she felt the same way last Friday, and then just felt weak, then her other symptoms came back.     Pt. Reports that she has been having menstrual bleeding that hasn't stopped since September. Reports seeing her OB who prescribed her Iron and gave her an injection.

## 2024-01-24 ENCOUNTER — TELEPHONE (OUTPATIENT)
Dept: OBGYN CLINIC | Facility: CLINIC | Age: 44
End: 2024-01-24

## 2024-01-24 LAB
BASOPHILS # BLD AUTO: 0.08 X10(3) UL (ref 0–0.2)
BASOPHILS NFR BLD AUTO: 0.7 %
DEPRECATED RDW RBC AUTO: 49.1 FL (ref 35.1–46.3)
EOSINOPHIL # BLD AUTO: 0.22 X10(3) UL (ref 0–0.7)
EOSINOPHIL NFR BLD AUTO: 1.9 %
ERYTHROCYTE [DISTWIDTH] IN BLOOD BY AUTOMATED COUNT: 18.7 % (ref 11–15)
HCT VFR BLD AUTO: 22.4 %
HGB BLD-MCNC: 6.9 G/DL
IMM GRANULOCYTES # BLD AUTO: 0.39 X10(3) UL (ref 0–1)
IMM GRANULOCYTES NFR BLD: 3.4 %
LYMPHOCYTES # BLD AUTO: 2.55 X10(3) UL (ref 1–4)
LYMPHOCYTES NFR BLD AUTO: 22 %
MCH RBC QN AUTO: 29.1 PG (ref 26–34)
MCHC RBC AUTO-ENTMCNC: 30.8 G/DL (ref 31–37)
MCV RBC AUTO: 94.5 FL
MONOCYTES # BLD AUTO: 0.6 X10(3) UL (ref 0.1–1)
MONOCYTES NFR BLD AUTO: 5.2 %
NEUTROPHILS # BLD AUTO: 7.74 X10 (3) UL (ref 1.5–7.7)
NEUTROPHILS # BLD AUTO: 7.74 X10(3) UL (ref 1.5–7.7)
NEUTROPHILS NFR BLD AUTO: 66.8 %
PLATELET # BLD AUTO: 514 10(3)UL (ref 150–450)
RBC # BLD AUTO: 2.37 X10(6)UL
WBC # BLD AUTO: 11.6 X10(3) UL (ref 4–11)

## 2024-01-24 NOTE — TELEPHONE ENCOUNTER
Filipino speaking ,   Pt  is calling about medication she picked up .   Asking for  a nurse to call her

## 2024-01-24 NOTE — ED PROVIDER NOTES
Patient Seen in: [facility] Emergency Department    History     Chief Complaint   Patient presents with    Nausea/Vomiting/Diarrhea       HPI    43-year-old female with a history of abnormal uterine bleeding complicated by anemia who presents to the emergency department with fatigue for the last 5 weeks.  She denies any abdominal pain.  She was provided a Depo-Provera intramuscular dose 5 days ago and reports mild improvement in vaginal bleeding however this still persist, filling 1-2 pads per day.  She was prescribed an OCP as well however this was too expensive and she has not started this medication.  She denies passing large blood clots vaginally.  She denies any pelvic pain or any abdominal pain or nausea or emesis.    History reviewed.   Past Medical History:   Diagnosis Date    Anemia        History reviewed. History reviewed. No pertinent surgical history.      Medications :  (Not in a hospital admission)       Family History   Problem Relation Age of Onset    Hypertension Father     Other (diabetes mellitus) Brother     Hypertension Mother        Smoking Status:   Social History     Socioeconomic History    Marital status: Single    Number of children: 2   Tobacco Use    Smoking status: Never    Smokeless tobacco: Never   Vaping Use    Vaping Use: Never used   Substance and Sexual Activity    Alcohol use: Never     Alcohol/week: 0.0 standard drinks of alcohol    Drug use: Never       Constitutional and vital signs reviewed.      Social History and Family History elements reviewed from today, pertinent positives to the presenting problem noted.    Physical Exam     ED Triage Vitals   BP 01/23/24 1518 120/72   Pulse 01/23/24 1518 104   Resp 01/23/24 1518 18   Temp 01/23/24 1518 97.9 °F (36.6 °C)   Temp src 01/23/24 1943 Oral   SpO2 01/23/24 1518 99 %   O2 Device 01/23/24 1518 None (Room air)       All measures to prevent infection transmission during my interaction with the patient were taken. The  patient was already wearing a droplet mask on my arrival to the room. Personal protective equipment was worn throughout the duration of the exam.  Handwashing was performed prior to and after the exam.  Stethoscope and any equipment used during my examination was cleaned with super sani-cloth germicidal wipes following the exam.     Physical Exam    General: No acute distress  Head: Normocephalic and atraumatic.  Mouth/Throat/Ears/Nose: Oropharynx is clear   Eyes: Conjunctivae and EOM are normal.   Neck: Normal range of motion. Supple.   Cardiovascular: Normal rate, regular rhythm, normal heart sounds.  Respiratory/Chest: Clear and equal bilaterally. Exhibits no tenderness.  Gastrointestinal: Soft, non-tender, non-distended. Bowel sounds are normal.   Musculoskeletal:No swelling or deformity.   Neurological: Alert and appropriate. No focal deficits.   Skin: Skin is warm and dry. No pallor.  Psychiatric: Has a normal mood and affect.      ED Course        Labs Reviewed   COMP METABOLIC PANEL (14) - Abnormal; Notable for the following components:       Result Value    Glucose 110 (*)     AST 35 (*)     Bilirubin, Total 0.2 (*)     All other components within normal limits   CBC W/ DIFFERENTIAL - Abnormal; Notable for the following components:    WBC 11.6 (*)     RBC 2.37 (*)     HGB 6.9 (*)     HCT 22.4 (*)     MCHC 30.8 (*)     RDW-SD 49.1 (*)     RDW 18.7 (*)     .0 (*)     Neutrophil Absolute Prelim 7.74 (*)     All other components within normal limits   CBC WITH DIFFERENTIAL WITH PLATELET    Narrative:     The following orders were created for panel order CBC With Differential With Platelet.                  Procedure                               Abnormality         Status                                     ---------                               -----------         ------                                     CBC W/ DIFFERENTIAL[[id number]]          Abnormal            Preliminary result                                            Please view results for these tests on the individual orders.   MD BLOOD SMEAR CONSULT   TYPE AND SCREEN    Narrative:     The following orders were created for panel order Type and screen.                  Procedure                               Abnormality         Status                                     ---------                               -----------         ------                                     ABORH (Blood Type)[[id number]]                               Final result                               Antibody Screen[[id number]]                                  Final result                                                 Please view results for these tests on the individual orders.   PREPARE RBC   ABORH (BLOOD TYPE)   ANTIBODY SCREEN   RAINBOW DRAW LAVENDER   RAINBOW DRAW LIGHT GREEN   RAINBOW DRAW BLUE   RAINBOW DRAW GOLD       As Interpreted by me    Imaging Results Available and Reviewed while in ED: No results found.  ED Medications Administered: Medications - No data to display      MDM     Vitals:    01/23/24 1518 01/23/24 1943 01/23/24 2000 01/23/24 2048   BP: 120/72 107/75 114/75 106/57   Pulse: 104 82 83 81   Resp: 18 18 18 18   Temp: 97.9 °F (36.6 °C) 98.6 °F (37 °C) 97.7 °F (36.5 °C) 98.2 °F (36.8 °C)   TempSrc:  Oral Oral Oral   SpO2: 99% 100% 100% 100%   Weight: 80.7 kg      Height: 152.4 cm (5')        *I personally reviewed and interpreted all ED vitals.    Pulse Ox: 100%, Room air, Normal     Monitor Interpretation:   normal sinus rhythm as interpreted by me.  The cardiac monitor was ordered given anemia/vaginal bleeding.      Medical Decision Making      Differential Diagnosis/ Diagnostic Considerations: Abnormal uterine bleeding, iron deficiency anemia    Complicating Factors: The patient already has does not have a problem list on file. to contribute to the complexity of this ED evaluation.    I reviewed prior chart records including telephone communication note from  yesterday in addition to the ED notes from January 19 and January 18 and the gynecology office visit note from January 18 as well.  Patient is here with abnormal uterine bleeding that is chronic, her labs are notable on my interpretation for hemoglobin of 6.9 which decreased from 7.2, 4 days ago.  She is hemodynamically stable.  Given her symptomatic anemia, she was transfused with 1 unit of blood without complication.    Discussed with  who agreed with the plan    Social determinants of health: I discussed with Dr. Allen who recommended Estradiol 1 mg TID x7 instead of the premarin as this was too expensive for the patient to afford. Prescription provided.     I spent 31 minutes of critical care time caring for this patient. This does not include time spent on separately reported billable procedures.       Disposition and Plan     Clinical Impression:  1. Abnormal uterine bleeding    2. Iron deficiency anemia, unspecified iron deficiency anemia type        Disposition:  Discharge    Follow-up:  Charli Nunez MD  [address]  [address]  [place] [address] [phone]    Schedule an appointment as soon as possible for a visit in 1 day(s)      Devin Tang PA-C  [address]  [facility] [id number] [phone]    Schedule an appointment as soon as possible for a visit in 1 day(s)        Medications Prescribed:  Discharge Medication List as of 1/23/2024  8:49 PM        START taking these medications    Details   estradiol 1 MG Oral Tab Take 1 tablet (1 mg total) by mouth in the morning, at noon, and at bedtime for 7 days., Normal, Disp-21 tablet, R-0

## 2024-01-24 NOTE — TELEPHONE ENCOUNTER
Georgian only  Pt asking if the new medication will stop the bleeding.  It looks like you addressed, but pt would like to speak with a nurse.    Pls

## 2024-01-24 NOTE — TELEPHONE ENCOUNTER
# [id number]    Pt asking if the medication that was prescribed instead of the Premarin will work in the same amount of time. Pt asking if the medications are the same.     Per ED noted, pt was prescribed Estradiol in place of Premarin due to cost. Pt advised that the medications are not exactly the same, and that it is difficult to know how long it will take for the medication to have effect. Pt states she has been taking for 24 hours and is still having bleeding. Pt advised to continue to take as directed. Advised follow-up appointment in next few days as recommended at ED visit. Pt declines at this time, stating she will continue to monitor bleeding and will call back with questions or concerns. Pt has follow-up scheduled 2/14 and a pelvic US 1/26.

## 2024-01-24 NOTE — TELEPHONE ENCOUNTER
Patient verified name and     Patient wondering if Estradiol will help with bleeding. States she does not see it written in the prescription which is why she was curious. Discussed Rx was prescribed for bleeding. Patient was recommended to follow up with [facility] after ER visit. Scheduled /2. If new or worsening symptoms patient to call back. SHANTA

## 2024-01-25 LAB
BLOOD TYPE BARCODE: 5100
UNIT VOLUME: 350 ML

## 2024-01-26 ENCOUNTER — HOSPITAL ENCOUNTER (OUTPATIENT)
Dept: ULTRASOUND IMAGING | Facility: HOSPITAL | Age: 44
Discharge: HOME | End: 2024-01-26
Attending: STUDENT IN AN ORGANIZED HEALTH CARE EDUCATION/TRAINING PROGRAM

## 2024-01-26 DIAGNOSIS — N92.1 MENORRHAGIA WITH IRREGULAR CYCLE: ICD-10-CM

## 2024-01-26 PROCEDURE — 76830 TRANSVAGINAL US NON-OB: CPT | Performed by: STUDENT IN AN ORGANIZED HEALTH CARE EDUCATION/TRAINING PROGRAM

## 2024-01-26 PROCEDURE — 76856 US EXAM PELVIC COMPLETE: CPT | Performed by: STUDENT IN AN ORGANIZED HEALTH CARE EDUCATION/TRAINING PROGRAM

## 2024-02-02 ENCOUNTER — LAB ENCOUNTER (OUTPATIENT)
Dept: LAB | Facility: HOSPITAL | Age: 44
End: 2024-02-02
Attending: STUDENT IN AN ORGANIZED HEALTH CARE EDUCATION/TRAINING PROGRAM
Payer: COMMERCIAL

## 2024-02-02 ENCOUNTER — OFFICE VISIT (OUTPATIENT)
Dept: OBGYN CLINIC | Facility: CLINIC | Age: 44
End: 2024-02-02

## 2024-02-02 VITALS
DIASTOLIC BLOOD PRESSURE: 70 MMHG | SYSTOLIC BLOOD PRESSURE: 102 MMHG | BODY MASS INDEX: 34.95 KG/M2 | WEIGHT: 178 LBS | HEIGHT: 60 IN

## 2024-02-02 DIAGNOSIS — N92.0 EXCESSIVE OR FREQUENT MENSTRUATION: ICD-10-CM

## 2024-02-02 DIAGNOSIS — D50.0 IRON DEFICIENCY ANEMIA DUE TO CHRONIC BLOOD LOSS: ICD-10-CM

## 2024-02-02 DIAGNOSIS — N92.1 MENORRHAGIA WITH IRREGULAR CYCLE: ICD-10-CM

## 2024-02-02 DIAGNOSIS — N92.1 MENORRHAGIA WITH IRREGULAR CYCLE: Primary | ICD-10-CM

## 2024-02-02 LAB
BASOPHILS # BLD AUTO: 0.06 X10(3) UL (ref 0–0.2)
BASOPHILS NFR BLD AUTO: 0.8 %
DEPRECATED RDW RBC AUTO: 76.1 FL (ref 35.1–46.3)
EOSINOPHIL # BLD AUTO: 0.16 X10(3) UL (ref 0–0.7)
EOSINOPHIL NFR BLD AUTO: 2.2 %
ERYTHROCYTE [DISTWIDTH] IN BLOOD BY AUTOMATED COUNT: 21.8 % (ref 11–15)
HCT VFR BLD AUTO: 28 %
HGB BLD-MCNC: 8.4 G/DL
IMM GRANULOCYTES # BLD AUTO: 0.03 X10(3) UL (ref 0–1)
IMM GRANULOCYTES NFR BLD: 0.4 %
LYMPHOCYTES # BLD AUTO: 2.12 X10(3) UL (ref 1–4)
LYMPHOCYTES NFR BLD AUTO: 28.9 %
MCH RBC QN AUTO: 30 PG (ref 26–34)
MCHC RBC AUTO-ENTMCNC: 30 G/DL (ref 31–37)
MCV RBC AUTO: 100 FL
MONOCYTES # BLD AUTO: 0.54 X10(3) UL (ref 0.1–1)
MONOCYTES NFR BLD AUTO: 7.4 %
NEUTROPHILS # BLD AUTO: 4.42 X10 (3) UL (ref 1.5–7.7)
NEUTROPHILS # BLD AUTO: 4.42 X10(3) UL (ref 1.5–7.7)
NEUTROPHILS NFR BLD AUTO: 60.3 %
PLATELET # BLD AUTO: 507 10(3)UL (ref 150–450)
PLATELET MORPHOLOGY: NORMAL
RBC # BLD AUTO: 2.8 X10(6)UL
WBC # BLD AUTO: 7.3 X10(3) UL (ref 4–11)

## 2024-02-02 PROCEDURE — 36415 COLL VENOUS BLD VENIPUNCTURE: CPT

## 2024-02-02 PROCEDURE — 85025 COMPLETE CBC W/AUTO DIFF WBC: CPT

## 2024-02-02 RX ORDER — NORGESTIMATE AND ETHINYL ESTRADIOL 7DAYSX3 28
KIT ORAL
COMMUNITY
Start: 2024-01-29

## 2024-02-02 NOTE — PROGRESS NOTES
[facility]  Obstetrics and Gynecology  Gyne Problem Visit      Mica Mackey is a 43 year old female  presenting for follow-up. Last time she was seen she was given Depo-Provera injection for heavy prolonged menses. Since then she was still bleeding and it was decided to start her on Premarin x3 weeks. Pt could not afford medication and went to ER. Hgb had dropped to 6.9 from 7.2. She was given 1 unit of PRBC. She was also started on estradiol 1mg TID x7. States yesterday she stopped bleeding. She has been slowly feeling better. Ultrasound done on 23 showed suspected polypoid lesion within the endometrial canal. She is still feeling weak with occasional lightheadedness.     No LMP recorded (lmp unknown).     Pap: 2022  Contraception:Depo    OBSTETRICS HISTORY:  OB History    Para Term  AB Living   2 2 0 0 0 2   SAB IAB Ectopic Multiple Live Births   0 0 0 0 2       GYNE HISTORY:  Hx Prior Abnormal Pap: No   Menarche: 11 (2024  4:02 PM)  Period Cycle (Days): irregular (2024 10:04 AM)  Period Duration (Days): 7 days (2024 10:04 AM)  Period Flow: light-heavy (2024 10:04 AM)  Use of Birth Control (if yes, specify type): None (2024 10:04 AM)  Hx Prior Abnormal Pap: No (2024 10:04 AM)        2022    10:37 AM   RECENT PAP RESULTS   Thinprep Pap Negative for intraepithelial lesion or malignancy.          History   Sexual Activity    Sexual activity: Not on file       MEDICAL HISTORY:  Past Medical History:   Diagnosis Date    Anemia      No past surgical history on file.    SOCIAL HISTORY:  Social History     Socioeconomic History    Marital status: Single     Spouse name: Not on file    Number of children: 2    Years of education: Not on file    Highest education level: Not on file   Occupational History    Not on file   Tobacco Use    Smoking status: Never    Smokeless tobacco: Never   Vaping Use    Vaping Use: Never used   Substance and Sexual Activity     Alcohol use: Never     Alcohol/week: 0.0 standard drinks of alcohol    Drug use: Never    Sexual activity: Not on file   Other Topics Concern    Not on file   Social History Narrative    2 children,              Social Determinants of Health     Financial Resource Strain: Not on file   Food Insecurity: Not on file   Transportation Needs: Not on file   Physical Activity: Not on file   Stress: Not on file   Social Connections: Not on file   Housing Stability: Not on file       MEDICATIONS:    Current Outpatient Medications:     TRI-SPRINTEC 0.18/0.215/0.25 MG-35 MCG Oral Tab, , Disp: , Rfl:     Ferrous Sulfate 325 (65 Fe) MG Oral Tab, Take 1 tablet (325 mg total) by mouth daily with breakfast., Disp: 30 tablet, Rfl: 3    famotidine 20 MG Oral Tab, Take 1 tablet (20 mg total) by mouth 2 (two) times daily. (Patient not taking: Reported on 2/2/2024), Disp: , Rfl:     Omeprazole 40 MG Oral Capsule Delayed Release, Take 1 capsule (40 mg total) by mouth daily. (Patient not taking: Reported on 2/2/2024), Disp: 90 capsule, Rfl: 1    ALLERGIES:  No Known Allergies      REVIEW OF SYSTEMS:  Review of Systems   Constitutional:  Negative for chills, fever and unexpected weight change.   Respiratory: Negative.     Cardiovascular: Negative.    Gastrointestinal:  Negative for abdominal pain, constipation, diarrhea and nausea.   Genitourinary:  Negative for dyspareunia, dysuria, genital sores, hematuria, menstrual problem, pelvic pain, vaginal bleeding, vaginal discharge and vaginal pain.   Musculoskeletal: Negative.    Skin: Negative.    Neurological:  Positive for weakness.   Hematological: Negative.    Psychiatric/Behavioral: Negative.         PHYSICAL EXAM:  /70   Ht 5' (1.524 m)   Wt 178 lb (80.7 kg)   LMP  (LMP Unknown)   BMI 34.76 kg/m²     GENERAL: well developed, well nourished, in no apparent distress  ABDOMEN: Soft, non distended; non tender, no masses  GYNE/: Deferred.     ASSESSMENT:         ICD-10-CM    1.  Menorrhagia with irregular cycle  N92.1       2. Iron deficiency anemia due to chronic blood loss  D50.0       3. Excessive or frequent menstruation  N92.0           Plan:  - Pt excused from work  - Repeat HGB today   - Continue taking supplemental iron  - She may continue with Depo injections   - She may follow-up with MD provider to discuss possible removal of endometrial polyp as potential cause to her prolonged menses.       YVONNE CALVO PA-C  10:08 AM  2/2/2024    Spent total time 30 minutes on obtaining history / chart review, evaluating patient / performing medically appropriate exam, discussing treatment options, counseling / educating, and completing documentation, coordinating care.

## 2024-02-08 ENCOUNTER — TELEPHONE (OUTPATIENT)
Dept: OBGYN CLINIC | Facility: CLINIC | Age: 44
End: 2024-02-08

## 2024-02-08 NOTE — TELEPHONE ENCOUNTER
Icelandic only  Pt asking what to do, after completing the pills from [place], she is bleeding again, and asking if she can get more pills    Pls advise

## 2024-02-08 NOTE — TELEPHONE ENCOUNTER
Pt seen in the ER on 01/23/24 for abnormal uterine bleeding. Pt placed on Estradiol 1 mg TID x 7 days. Pt then saw Devin on 02/02/24. Pt finished the Estradiol, and her bleeding stopped until today. Pt voices she is having a light-medium flow. Pt wants to know is she should have the estradiol refilled?

## 2024-02-09 RX ORDER — ESTRADIOL 1 MG/1
1 TABLET ORAL 3 TIMES DAILY
Qty: 21 TABLET | Refills: 0 | Status: SHIPPED | OUTPATIENT
Start: 2024-02-09 | End: 2024-02-16

## 2024-02-09 NOTE — TELEPHONE ENCOUNTER
Ukrainian phone line  used to translate phone call. Pt called and informed of Devin's recommendations. Pt voices her bleeding is not heavy, but her Hgb is low and she is worried that her hgb will go lower since she has been bleeding for 3 days. Pt is requesting a phone call from Devin URBINA.

## 2024-02-09 NOTE — TELEPHONE ENCOUNTER
Called patient back. States she had started to have light to moderate bleeding again. I states this could be a regular menses. I sent an rx for estradiol 1mg TID x7 fays. I advised her to wait, if her menses continues beyond the week then she should start medication. She has an appointment with Dr. Bryant on 3/5 for f/u.

## 2024-03-05 ENCOUNTER — OFFICE VISIT (OUTPATIENT)
Dept: OBGYN CLINIC | Facility: CLINIC | Age: 44
End: 2024-03-05

## 2024-03-05 ENCOUNTER — LAB ENCOUNTER (OUTPATIENT)
Dept: LAB | Facility: HOSPITAL | Age: 44
End: 2024-03-05
Attending: OBSTETRICS & GYNECOLOGY
Payer: COMMERCIAL

## 2024-03-05 VITALS
SYSTOLIC BLOOD PRESSURE: 121 MMHG | WEIGHT: 181.5 LBS | DIASTOLIC BLOOD PRESSURE: 77 MMHG | BODY MASS INDEX: 35 KG/M2 | HEART RATE: 78 BPM

## 2024-03-05 DIAGNOSIS — N92.1 MENORRHAGIA WITH IRREGULAR CYCLE: ICD-10-CM

## 2024-03-05 DIAGNOSIS — Z12.31 SCREENING MAMMOGRAM FOR BREAST CANCER: ICD-10-CM

## 2024-03-05 DIAGNOSIS — D50.0 IRON DEFICIENCY ANEMIA DUE TO CHRONIC BLOOD LOSS: ICD-10-CM

## 2024-03-05 DIAGNOSIS — Z01.419 WELL WOMAN EXAM WITH ROUTINE GYNECOLOGICAL EXAM: Primary | ICD-10-CM

## 2024-03-05 DIAGNOSIS — N84.0 ENDOMETRIAL POLYP: ICD-10-CM

## 2024-03-05 LAB
BASOPHILS # BLD AUTO: 0.07 X10(3) UL (ref 0–0.2)
BASOPHILS NFR BLD AUTO: 0.8 %
DEPRECATED RDW RBC AUTO: 49.4 FL (ref 35.1–46.3)
EOSINOPHIL # BLD AUTO: 0.16 X10(3) UL (ref 0–0.7)
EOSINOPHIL NFR BLD AUTO: 1.8 %
ERYTHROCYTE [DISTWIDTH] IN BLOOD BY AUTOMATED COUNT: 14.2 % (ref 11–15)
HCT VFR BLD AUTO: 41 %
HGB BLD-MCNC: 12.9 G/DL
IMM GRANULOCYTES # BLD AUTO: 0.02 X10(3) UL (ref 0–1)
IMM GRANULOCYTES NFR BLD: 0.2 %
LYMPHOCYTES # BLD AUTO: 2.81 X10(3) UL (ref 1–4)
LYMPHOCYTES NFR BLD AUTO: 31.4 %
MCH RBC QN AUTO: 29.7 PG (ref 26–34)
MCHC RBC AUTO-ENTMCNC: 31.5 G/DL (ref 31–37)
MCV RBC AUTO: 94.5 FL
MONOCYTES # BLD AUTO: 0.63 X10(3) UL (ref 0.1–1)
MONOCYTES NFR BLD AUTO: 7 %
NEUTROPHILS # BLD AUTO: 5.25 X10 (3) UL (ref 1.5–7.7)
NEUTROPHILS # BLD AUTO: 5.25 X10(3) UL (ref 1.5–7.7)
NEUTROPHILS NFR BLD AUTO: 58.8 %
PLATELET # BLD AUTO: 422 10(3)UL (ref 150–450)
RBC # BLD AUTO: 4.34 X10(6)UL
WBC # BLD AUTO: 8.9 X10(3) UL (ref 4–11)

## 2024-03-05 PROCEDURE — 36415 COLL VENOUS BLD VENIPUNCTURE: CPT

## 2024-03-05 PROCEDURE — 85025 COMPLETE CBC W/AUTO DIFF WBC: CPT

## 2024-03-05 PROCEDURE — 99214 OFFICE O/P EST MOD 30 MIN: CPT | Performed by: OBSTETRICS & GYNECOLOGY

## 2024-03-05 NOTE — PROGRESS NOTES
HPI:    Patient ID: Mica Mackey is a 43 year old year old female.    HPI  New patient  GYN problem visit  43-year-old  2 para 2 experiencing dysfunctional uterine bleeding.  With episodes of heavy bleeding and significant anemia.  Went to emergency room and had hemoglobin drop from 7.2-6.9 and received 1 unit of packed red blood cells.  Had been treated with estradiol 1 mg p.o. 3 times daily    She states that her menstrual problems began in September.  Her lifelong pattern of menses has been that she is irregular with menstrual periods coming every 2 to 3 months and lasting 7 days.  In September began to spot for an entire month followed by menstrual-like bleeding for what sounds like 3 or month 4 months continually.  She did not seek care due to lack of insurance.  In  she saw our physician assistant and she received a shot of Depo-Provera.  On  or  went to the emergency room and that is when she received transfusion and high-dose estradiol.  Her recent bleeding stopped few days ago.  She denies pelvic pain.  Pelvic ultrasound showed the following  PROCEDURE: US PELVIS TRANSABDOMINAL AND TRANSVAGINAL (CPT=76856/20010)     COMPARISON: None available.     INDICATIONS: Menorrhagia.     TECHNIQUE: Pelvic ultrasound using transabdominal and transvaginal technique.  A transvaginal scan was performed for improved tissue characterization of the uterus, enhanced visualization of the endometrial canal, and ovarian/adnexal evaluation.     FINDINGS:  UTERUS:    SIZE: The uterus measures 10.3 x 5.7 x 6.2 cm.  ENDOMETRIUM: Endometrial thickness 1.9 cm. An ovoid hyperechogenic mass of the endometrial canal measures 0.7 x 0.5 x 0.9 cm.    MYOMETRIUM: Normal echogenicity and homogeneous in echotexture without masses.       OVARIES AND ADNEXA:  RIGHT:   The right ovary measures 3.0 x 1.8 x 1.4 cm and has a sonographically unremarkable appearance.  LEFT:   The left ovary measures 3.2  x 1.8 x 1.4 cm and demonstrates a grossly unremarkable sonographic appearance.     CUL-DE-SAC:   No free fluid or mass.    OTHER: Limited sonographic views of the bladder are grossly unremarkable.                  Impression   CONCLUSION:  1. There is a suspected polypoid lesion of the endometrial complex, not well characterized; consider histopathologic sampling if clinically warranted.     2. Sonographically unremarkable appearance of the ovaries.           Dictated by (CST): Aldo León MD on 1/26/2024 at 2:39 PM      Finalized by (CST): Aldo León MD on 1/26/2024 at 2:42 PM     On February 2 hemoglobin level of 8.4.  On January 18 had endometrial biopsy done which was benign secretory endometrium.  States that she has not had any vaginal bleeding since first days of February.  She requests a check of her anemia due to feeling persistently weak.    Review of Systems   Constitutional:  Positive for fatigue.   Cardiovascular: Negative.    Gastrointestinal: Negative.    Genitourinary: Negative.    Skin: Negative.    Neurological: Negative.    Psychiatric/Behavioral: Negative.     All other systems reviewed and are negative.       Current Outpatient Medications   Medication Sig Dispense Refill    Ferrous Sulfate 325 (65 Fe) MG Oral Tab Take 1 tablet (325 mg total) by mouth daily with breakfast. 30 tablet 3    famotidine 20 MG Oral Tab Take 1 tablet (20 mg total) by mouth 2 (two) times daily. (Patient not taking: Reported on 2/2/2024)      Omeprazole 40 MG Oral Capsule Delayed Release Take 1 capsule (40 mg total) by mouth daily. (Patient not taking: Reported on 2/2/2024) 90 capsule 1       Past Medical History:   Diagnosis Date    Anemia        History reviewed. No pertinent surgical history.    Family History   Problem Relation Age of Onset    Hypertension Father     Other (diabetes mellitus) Brother     Hypertension Mother        Social History     Socioeconomic History    Marital status: Single     Spouse  name: Not on file    Number of children: 2    Years of education: Not on file    Highest education level: Not on file   Occupational History    Not on file   Tobacco Use    Smoking status: Never    Smokeless tobacco: Never   Vaping Use    Vaping Use: Never used   Substance and Sexual Activity    Alcohol use: Never     Alcohol/week: 0.0 standard drinks of alcohol    Drug use: Never    Sexual activity: Not on file   Other Topics Concern    Not on file   Social History Narrative    2 children,              Social Determinants of Health     Financial Resource Strain: Not on file   Food Insecurity: Not on file   Transportation Needs: Not on file   Physical Activity: Not on file   Stress: Not on file   Social Connections: Not on file   Housing Stability: Not on file       Physical Exam     Vitals: /77 (BP Location: Right arm, Patient Position: Sitting, Cuff Size: adult)   Pulse 78   Wt 181 lb 8 oz (82.3 kg)   LMP 09/15/2023 (Approximate)   BMI 35.45 kg/m²     Constitutional: She appears well-developed and well-nourished.     Musculoskeletal: Normal range of motion of upper and lower extremities.   Neurological: She is alert and oriented x 3.   Skin: Skin is warm without pallor.  Psychiatric: Her behavior is normal. Judgment normal.  Able to communicate verbally.    HEENT:  EOMI.  LISA.  Sclera anicteric.    Head: Normocephalic.  Normal hair distribution.  No lesions.  Neck: Normal range of motion.      Adenopathy:  No supraclavicular or cervical adenopathy.       Abdominal: Soft. Normal appearance and bowel sounds are normal. She exhibits no mass. There is no hepatosplenomegaly. There is no tenderness. There is no rebound and no CVA tenderness. No hernia. Hernia negative in the ventral area,  negative in the right inguinal area and negative in the left inguinal area.   Lymphadenopathy:        Right: No inguinal adenopathy present.        Left: No inguinal adenopathy present.     Genitourinary:   Pelvic exam was  performed with patient supine and chaperone present.  External genitalia- normal.  Bartholin's and [place]'s glands normal.  Urethral meatus- without lesions, mass, or discharge.  Urethra- normal without lesion, cyst, mass, or tenderness.  Vulva- normal.  Labia majora and minora without lesions.   Vagina- normal, no lesions or discharge.  Moist and well supported.  Bladder-  nontender.  No masses.  Normal support.  No evidence of cystocele,  abnormal bladder neck mobility or evident urinary incontinence.  Cervix- smooth, normal epithelium without lesions or discharge.  No motion tenderness.   Uterus- normal size, shape, and contour.  Nontender.  No masses.  Adnexa-  Nontender, no masses.   Perineum- normal without lesions  Anus-  Normal appearing without lesions.    ASSESSMENT/PLAN:      ICD-10-CM    1.  Z01.419       2. Screening mammogram for breast cancer  Z12.31       3. Menorrhagia with irregular cycle  N92.1       4. Endometrial polyp  N84.0       Return for saline sonography to evaluate uterine cavity for endometrial lesions.  Counseled extensively    Outpatient Encounter Medications as of 3/5/2024   Medication Sig Dispense Refill    Ferrous Sulfate 325 (65 Fe) MG Oral Tab Take 1 tablet (325 mg total) by mouth daily with breakfast. 30 tablet 3    famotidine 20 MG Oral Tab Take 1 tablet (20 mg total) by mouth 2 (two) times daily. (Patient not taking: Reported on 2/2/2024)      Omeprazole 40 MG Oral Capsule Delayed Release Take 1 capsule (40 mg total) by mouth daily. (Patient not taking: Reported on 2/2/2024) 90 capsule 1     No facility-administered encounter medications on file as of 3/5/2024.

## 2024-03-06 ENCOUNTER — TELEPHONE (OUTPATIENT)
Dept: OBGYN CLINIC | Facility: CLINIC | Age: 44
End: 2024-03-06

## 2024-03-06 NOTE — TELEPHONE ENCOUNTER
Patient verified name and     Informed patient of normal results. Verbalized understanding and agreed.

## 2024-03-19 ENCOUNTER — OFFICE VISIT (OUTPATIENT)
Dept: OBGYN CLINIC | Facility: CLINIC | Age: 44
End: 2024-03-19

## 2024-03-19 VITALS — HEART RATE: 68 BPM | SYSTOLIC BLOOD PRESSURE: 125 MMHG | DIASTOLIC BLOOD PRESSURE: 78 MMHG

## 2024-03-19 DIAGNOSIS — Z01.818 PREPROCEDURAL EXAMINATION: Primary | ICD-10-CM

## 2024-03-19 DIAGNOSIS — N92.1 MENOMETRORRHAGIA: ICD-10-CM

## 2024-03-19 DIAGNOSIS — N84.0 ENDOMETRIAL POLYP: ICD-10-CM

## 2024-03-19 LAB
KIT LOT #: NORMAL NUMERIC
PREGNANCY TEST, URINE: NEGATIVE

## 2024-03-19 NOTE — PROGRESS NOTES
Subjective:     Patient ID: Mica Mackey is a 43 year old female.    HPI  Gyne visit follow-up  Has not had any bleeding since early February.  Saline sonography today shows a normal-appearing endometrium without irregularity.  A very small 3 x 5 x 7 mm polyp high within the fundus.  No myometrial masses.  Ovaries normal-appearing.  Counseled that suspected endometrial polyp appears to be smaller than previous and in light of resolution of her bleeding will monitor expectantly for now.  Patient currently will defer medication all hormonal treatments.  To notify for recurrence of abnormal bleeding whether prolonged or heavy.  History/Other:   Review of Systems  Current Outpatient Medications   Medication Sig Dispense Refill    Ferrous Sulfate 325 (65 Fe) MG Oral Tab Take 1 tablet (325 mg total) by mouth daily with breakfast. 30 tablet 3    famotidine 20 MG Oral Tab Take 1 tablet (20 mg total) by mouth 2 (two) times daily. (Patient not taking: Reported on 2/2/2024)      Omeprazole 40 MG Oral Capsule Delayed Release Take 1 capsule (40 mg total) by mouth daily. (Patient not taking: Reported on 2/2/2024) 90 capsule 1     Allergies:No Known Allergies    Past Medical History:   Diagnosis Date    Anemia       No past surgical history on file.   Family History   Problem Relation Age of Onset    Hypertension Father     Other (diabetes mellitus) Brother     Hypertension Mother       Social History:   Social History     Socioeconomic History    Marital status: Single    Number of children: 2   Tobacco Use    Smoking status: Never    Smokeless tobacco: Never   Vaping Use    Vaping Use: Never used   Substance and Sexual Activity    Alcohol use: Never     Alcohol/week: 0.0 standard drinks of alcohol    Drug use: Never   Social History Narrative    2 children,                 Objective:   Physical Exam    Assessment & Plan:   1. Preprocedural examination        Orders Placed This Encounter   Procedures    POC  Urine pregnancy test [30106]       Meds This Visit:  Requested Prescriptions      No prescriptions requested or ordered in this encounter       Imaging & Referrals:  None

## 2024-09-24 ENCOUNTER — LAB ENCOUNTER (OUTPATIENT)
Dept: LAB | Facility: HOSPITAL | Age: 44
End: 2024-09-24
Attending: OBSTETRICS & GYNECOLOGY
Payer: COMMERCIAL

## 2024-09-24 ENCOUNTER — OFFICE VISIT (OUTPATIENT)
Dept: OBGYN CLINIC | Facility: CLINIC | Age: 44
End: 2024-09-24

## 2024-09-24 VITALS — BODY MASS INDEX: 36 KG/M2 | WEIGHT: 183.81 LBS | DIASTOLIC BLOOD PRESSURE: 75 MMHG | SYSTOLIC BLOOD PRESSURE: 110 MMHG

## 2024-09-24 DIAGNOSIS — N92.6 IRREGULAR MENSES: ICD-10-CM

## 2024-09-24 DIAGNOSIS — N92.6 MISSED MENSES: ICD-10-CM

## 2024-09-24 DIAGNOSIS — Z01.419 WELL WOMAN EXAM WITH ROUTINE GYNECOLOGICAL EXAM: ICD-10-CM

## 2024-09-24 DIAGNOSIS — Z12.31 ENCOUNTER FOR SCREENING MAMMOGRAM FOR BREAST CANCER: Primary | ICD-10-CM

## 2024-09-24 LAB
B-HCG SERPL-ACNC: <2.6 MIU/ML
CONTROL LINE PRESENT WITH A CLEAR BACKGROUND (YES/NO): YES YES/NO
FSH SERPL-ACNC: 9.4 MIU/ML
KIT LOT #: NORMAL NUMERIC
PREGNANCY TEST, URINE: NEGATIVE
PROGEST SERPL-MCNC: <0.21 NG/ML
PROLACTIN SERPL-MCNC: 25.7 NG/ML
TSI SER-ACNC: 2.34 MIU/ML (ref 0.55–4.78)

## 2024-09-24 PROCEDURE — 84144 ASSAY OF PROGESTERONE: CPT

## 2024-09-24 PROCEDURE — 81025 URINE PREGNANCY TEST: CPT | Performed by: OBSTETRICS & GYNECOLOGY

## 2024-09-24 PROCEDURE — 99212 OFFICE O/P EST SF 10 MIN: CPT | Performed by: OBSTETRICS & GYNECOLOGY

## 2024-09-24 PROCEDURE — 84443 ASSAY THYROID STIM HORMONE: CPT

## 2024-09-24 PROCEDURE — 84146 ASSAY OF PROLACTIN: CPT

## 2024-09-24 PROCEDURE — 84702 CHORIONIC GONADOTROPIN TEST: CPT

## 2024-09-24 PROCEDURE — 36415 COLL VENOUS BLD VENIPUNCTURE: CPT

## 2024-09-24 PROCEDURE — 99396 PREV VISIT EST AGE 40-64: CPT | Performed by: OBSTETRICS & GYNECOLOGY

## 2024-09-24 PROCEDURE — 83001 ASSAY OF GONADOTROPIN (FSH): CPT

## 2024-09-24 NOTE — PROGRESS NOTES
HPI:    Patient ID: Mica Mackey is a 44 year old year old female.    HPI  Well woman visit  44-year-old  2 para 2 last menstrual period-  History of dysfunctional uterine bleeding with anemia and transfusion.  States has had no menses since .  Denies hot flashes  Review of Systems   Constitutional: Negative.    Cardiovascular: Negative.    Gastrointestinal: Negative.    Genitourinary: Negative.    Skin: Negative.    Neurological: Negative.    Psychiatric/Behavioral: Negative.     All other systems reviewed and are negative.       Current Outpatient Medications   Medication Sig Dispense Refill    Omeprazole 40 MG Oral Capsule Delayed Release Take 1 capsule (40 mg total) by mouth daily. 90 capsule 1       Past Medical History:    Anemia       No past surgical history on file.    Family History   Problem Relation Age of Onset    Hypertension Father     Other (diabetes mellitus) Brother     Hypertension Mother        Social History     Socioeconomic History    Marital status: Single     Spouse name: Not on file    Number of children: 2    Years of education: Not on file    Highest education level: Not on file   Occupational History    Not on file   Tobacco Use    Smoking status: Never    Smokeless tobacco: Never   Vaping Use    Vaping status: Never Used   Substance and Sexual Activity    Alcohol use: Never     Alcohol/week: 0.0 standard drinks of alcohol    Drug use: Never    Sexual activity: Not on file   Other Topics Concern    Not on file   Social History Narrative    2 children,              Social Determinants of Health     Financial Resource Strain: Not on file   Food Insecurity: No Food Insecurity (2024)    Received from [facility]    Food Insecurity     Within the past 30 days, I worried whether my food would run out before I got money to buy more. / En los últimos 30 días, me preocupó que la comida se podía acabar antes  de tener dinero para compr...: Never true / Nunca     Within the past 30 days, the food that I bought just didn't last, and I didn't have money to get more. / En los últimos 30 días, La comida que compré no rindió lo suficiente, y no tenía dinero para...: Never true / Nunca   Transportation Needs: Not on file   Physical Activity: Not on file   Stress: Not on file   Social Connections: Not on file   Housing Stability: Not on file       Physical Exam     Vitals: /75   Wt 183 lb 12.8 oz (83.4 kg)   LMP 06/19/2024 (Approximate)   BMI 35.90 kg/m²     Constitutional: She appears well-developed and well-nourished.     Musculoskeletal: Normal range of motion of upper and lower extremities.   Neurological: She is alert and oriented x 3.   Skin: Skin is warm without pallor.  Psychiatric: Her behavior is normal. Judgment normal.  Able to communicate verbally.    HEENT:  EOMI.  LISA.  Sclera anicteric.    Head: Normocephalic.  Normal hair distribution.  No lesions.  Neck: Normal range of motion.      Adenopathy:  No supraclavicular or cervical adenopathy.  Thyroid:  Normal size, shape, and position.  No masses, tenderness, or nodules.  Cardiovascular: Normal rate and regular rhythm.    Pulmonary/Chest: Effort normal.   Abdominal: Soft. Normal appearance and bowel sounds are normal. She exhibits no mass. There is no hepatosplenomegaly. There is no tenderness. There is no rebound and no CVA tenderness. No hernia. Hernia negative in the ventral area,  negative in the right inguinal area and negative in the left inguinal area.   Lymphadenopathy:        Right: No inguinal adenopathy present.        Left: No inguinal adenopathy present.     Breasts:    Symmetric bilaterally.  Areolas without lesions.  Skin- normal without growths, lesions, erythema or peau d'orange change.  Nipples- without retraction or discharge.  No masses, lumps, skin changes, erythema, or lesions.  Axilla-  No adenopathy, mass, or  tenderness.    Genitourinary:   Pelvic exam was performed with patient supine and chaperone present.  External genitalia- normal.  Bartholin's and [place]'s glands normal.  Urethral meatus- without lesions, mass, or discharge.  Urethra- normal without lesion, cyst, mass, or tenderness.  Vulva- normal.  Labia majora and minora without lesions.   Vagina- normal, no lesions or discharge.  Moist and well supported.  Bladder-  nontender.  No masses.  Normal support.  No evidence of cystocele,  abnormal bladder neck mobility or evident urinary incontinence.  Cervix- smooth, normal epithelium without lesions or discharge.  No motion tenderness.   Uterus- normal size, shape, and contour.  Nontender.  No masses.  Adnexa-  Nontender, no masses.   Perineum- normal without lesions  Anus-  Normal appearing without lesions.    ASSESSMENT/PLAN:      ICD-10-CM    1. Encounter for screening mammogram for breast cancer  Z12.31 [facility] JAMIL 2D+3D SCREENING BILAT (CPT=77067/34534)      Well woman visit  Irregular menses/rule out anovulation  Check hormones and check UCG here in office.  Negative  Fibrous breasts.  SBE and annual mammograms.  Has never had a mammogram.  Encourage.    Normal exam.  Pap/HPV co-test every 3 years when previous normal/-HPV.  Monthly self breast exam.  Annual screening mammograms.  Contraception discussed as needed.  Screening colonoscopy at age 50, earlier if indicated.  Recommend regular exercise and quality diet.  Return to clinic in one year or as needed.      Outpatient Encounter Medications as of 9/24/2024   Medication Sig Dispense Refill    Omeprazole 40 MG Oral Capsule Delayed Release Take 1 capsule (40 mg total) by mouth daily. 90 capsule 1    [DISCONTINUED] Ferrous Sulfate 325 (65 Fe) MG Oral Tab Take 1 tablet (325 mg total) by mouth daily with breakfast. (Patient not taking: Reported on 9/24/2024) 30 tablet 3     No facility-administered encounter medications on file as of 9/24/2024.

## 2024-09-25 ENCOUNTER — TELEPHONE (OUTPATIENT)
Dept: OBGYN CLINIC | Facility: CLINIC | Age: 44
End: 2024-09-25

## 2024-09-25 LAB — HPV E6+E7 MRNA CVX QL NAA+PROBE: NEGATIVE

## 2024-09-25 RX ORDER — MEDROXYPROGESTERONE ACETATE 10 MG
TABLET ORAL
Qty: 36 TABLET | Refills: 0 | Status: SHIPPED | OUTPATIENT
Start: 2024-09-25

## 2024-09-25 NOTE — TELEPHONE ENCOUNTER
----- Message from Spencer Bryant sent at 9/25/2024  8:24 AM CDT -----  Please inform of lab hormone tests showing she is not menopausal and progesterone is low.  Normal thyroid screen, prolactin and pregnancy test neg.  Recommend progesterone for menses to come.  At least one course but monthly preferred.  ERx for Provera x 3 cycles.

## (undated) NOTE — LETTER
Dear Employer,  At [place] [address], we are taking special precautions and doing everything we can to prevent the spread of COVID-19 (coronavirus disease 2019).  During this time, we ask for your assistance regarding physician documentation for empl

## (undated) NOTE — LETTER
1/3/2020          To Whom It May Concern:    Adelita Tate is currently under my medical care and may return to work at this time. Activity is restricted as follows: no lifting over 20 lbs. as this will aggravate her sciatic pain.  Please excuse patien

## (undated) NOTE — LETTER
4/2/2020      To Whom It May Concern:    Lorra Meckel is currently under my medical care for upper respiratory infection and may not return to work at this time. Please excuse Susan Bodily for 3 weeks. (as of 3/23/20)  She may return to work on 4/13/20.

## (undated) NOTE — Clinical Note
2/28/2017              [address]        [address] [facility] [address]         Alban Ureña should be off work from 2/27 until 3/4/2017 due to acute illness.       Sincerely,    MD Gracie Singh [address],

## (undated) NOTE — LETTER
4/21/2020          To Whom It May Concern:    Cedrick Saunders is currently under my medical care and may not return to work at this time. She may return to work on 4/27/2020. Activity is restricted as follows: none.     If you require additional in

## (undated) NOTE — LETTER
2/2/2024          To Whom It May Concern:    Mica Mackey is currently under my medical care at this time.    Please excuse Mica from 1/5/2024 through 1/12/2024 due to medical reasons. If you require additional information please contact our office.        Sincerely,      YVONNE CALVO PA-C

## (undated) NOTE — LETTER
4/18/2020        To Whom It May Concern:     Kaci Espinosa is currently under my medical care for upper respiratory infection and may not return to work at this time. Please excuse Kylie Cronin for 3 weeks. (as of 3/23/20)  She may return to work on 4/20/20.

## (undated) NOTE — LETTER
12/26/2019          To Whom It May Concern:    An Masters is currently under my medical care and may not return to work at this time. Please excuse Katy Valderrama for 5 days. She may return to work on Monday December, 30th, 2019.   Activity is restricted as

## (undated) NOTE — LETTER
11/12/2021              Shena Patel        [address]  [address]         To Whom It May Concern,     Please excuse Shena Patel from any missed work as she was under my medical care.        Sincerely,    Eulalia Johns MD

## (undated) NOTE — LETTER
AUTHORIZATION FOR SURGICAL OPERATION OR OTHER PROCEDURE    1. I hereby authorize Dr. Bryant, and [facility] staff assigned to my case to perform the following operation and/or procedure at the [facility] site:    _______________________________________________________________________________________________    Saline Sonography  _______________________________________________________________________________________________    2.  My physician has explained the nature and purpose of the operation or other procedure, possible alternative methods of treatment, the risks involved, and the possibility of complication to me.  I acknowledge that no guarantee has been made as to the result that may be obtained.  3.  I recognize that, during the course of this operation, or other procedure, unforseen conditions may necessitate additional or different procedure than those listed above.  I, therefore, further authorize and request that the above named physician, his/her physician assistants or designees perform such procedures as are, in his/her professional opinion, necessary and desirable.  4.  Any tissue or organs removed in the operation or other procedure may be disposed of by and at the discretion of the [facility] and [facility].  5.  I understand that in the event of a medical emergency, I will be transported by local paramedics to [facility] or other hospital emergency department.  6.  I certify that I have read and fully understand the above consent to operation and/or other procedure.    7.  I acknowledge that my physician has explained sedation/analgesia administration to me including the risks and benefits.  I consent to the administration of sedation/analgesia as may be necessary or desirable in the judgement of my physician.    Witness signature: ___________________________________________________ Date:  ______/______/_____                    Time:   ________ A.M.  P.M.       Patient Name:  ______________________________________________________  (please print)      Patient signature:  ___________________________________________________             Relationship to Patient:           []  Parent    Responsible person                          []  Spouse  In case of minor or                    [] Other  _____________   Incompetent name:  __________________________________________________                               (please print)      _____________      Responsible person  In case of minor or  Incompetent signature:  _______________________________________________    Statement of Physician  My signature below affirms that prior to the time of the procedure, I have explained to the patient and/or his/her guardian, the risks and benefits involved in the proposed treatment and any reasonable alternative to the proposed treatment.  I have also explained the risks and benefits involved in the refusal of the proposed treatment and have answered the patient's questions.                        Date:  ______/______/_______  Provider                      Signature:  __________________________________________________________       Time:  ___________ A.M    P.M.

## (undated) NOTE — LETTER
1/13/2020          To Whom It May Concern:    Cedrick Saunders is currently under my medical care. Please excuse Kenya Mejia from lifting heavy objects at work as she needs physical therapy to rehab her back.    She may return to work to regular duties once she ha

## (undated) NOTE — LETTER
AUTHORIZATION FOR SURGICAL OPERATION OR OTHER PROCEDURE    1. I hereby authorize CARLA Torres, and [facility] staff assigned to my case to perform the following operation and/or procedure at the [facility]:    EMBx    2.  My physician has explained the nature and purpose of the operation or other procedure, possible alternative methods of treatment, the risks involved, and the possibility of complication to me.  I acknowledge that no guarantee has been made as to the result that may be obtained.  3.  I recognize that, during the course of this operation, or other procedure, unforseen conditions may necessitate additional or different procedure than those listed above.  I, therefore, further authorize and request that the above named physician, his/her physician assistants or designees perform such procedures as are, in his/her professional opinion, necessary and desirable.  4.  Any tissue or organs removed in the operation or other procedure may be disposed of by and at the discretion of the [facility] and [facility].  5.  I understand that in the event of a medical emergency, I will be transported by local paramedics to [facility] or other hospital emergency department.  6.  I certify that I have read and fully understand the above consent to operation and/or other procedure.    7.  I acknowledge that my physician has explained sedation/analgesia administration to me including the risks and benefits.  I consent to the administration of sedation/analgesia as may be necessary or desirable in the judgement of my physician.    Witness signature: ___________________________________________________ Date:  ______/______/_____                    Time:  ________ A.M.  P.M.       Patient Name:  ______________________________________________________  (please print)      Patient signature:  ___________________________________________________             Relationship to  Patient:           []  Parent    Responsible person                          []  Spouse  In case of minor or                    [] Other  _____________   Incompetent name:  __________________________________________________                               (please print)      _____________      Responsible person  In case of minor or  Incompetent signature:  _______________________________________________    Statement of Physician  My signature below affirms that prior to the time of the procedure, I have explained to the patient and/or his/her guardian, the risks and benefits involved in the proposed treatment and any reasonable alternative to the proposed treatment.  I have also explained the risks and benefits involved in the refusal of the proposed treatment and have answered the patient's questions.                        Date:  ______/______/_______  Provider                      Signature:  __________________________________________________________       Time:  ___________ A.M    P.M.

## (undated) NOTE — MR AVS SNAPSHOT
[facility] \[facility]\"" - [place] [address] [address] [id number] [phone]               Thank you for choosing us for your health care visit with Lily Acevedo MD.  We are glad to serve you and happy to provide you with this summary of yo

## (undated) NOTE — LETTER
Dear Employer,  At [place] [address], we are taking special precautions and doing everything we can to prevent the spread of COVID-19 (coronavirus disease 2019).  During this time, we ask for your assistance regarding physician documentation for empl